# Patient Record
Sex: FEMALE | Race: WHITE | NOT HISPANIC OR LATINO | Employment: OTHER | URBAN - METROPOLITAN AREA
[De-identification: names, ages, dates, MRNs, and addresses within clinical notes are randomized per-mention and may not be internally consistent; named-entity substitution may affect disease eponyms.]

---

## 2023-06-01 DIAGNOSIS — K21.9 GASTROESOPHAGEAL REFLUX DISEASE WITHOUT ESOPHAGITIS: ICD-10-CM

## 2023-06-01 DIAGNOSIS — I10 HYPERTENSION, ESSENTIAL, BENIGN: ICD-10-CM

## 2023-06-01 DIAGNOSIS — Z86.73 HISTORY OF CVA (CEREBROVASCULAR ACCIDENT): Primary | ICD-10-CM

## 2023-06-01 NOTE — TELEPHONE ENCOUNTER
Type of form: Medical Certification of illness    Received via: fax    Received from: [employer]    When completed and signed: Fax # [phone]    Form placed: In your to do folder

## 2023-06-09 PROBLEM — F41.9 ANXIETY: Status: ACTIVE | Noted: 2023-06-09

## 2023-06-09 PROBLEM — R41.3 MEMORY DEFICIT: Status: ACTIVE | Noted: 2023-06-09

## 2023-06-09 PROBLEM — K21.9 GASTROESOPHAGEAL REFLUX DISEASE WITHOUT ESOPHAGITIS: Status: ACTIVE | Noted: 2023-06-09

## 2023-06-09 PROBLEM — F32.1 CURRENT MODERATE EPISODE OF MAJOR DEPRESSIVE DISORDER WITHOUT PRIOR EPISODE (MULTI): Status: ACTIVE | Noted: 2023-06-09

## 2023-06-09 PROBLEM — Z85.828 HISTORY OF BASAL CELL CANCER: Status: RESOLVED | Noted: 2023-06-09 | Resolved: 2023-06-09

## 2023-06-09 PROBLEM — R93.5 ABNORMAL COMPUTED TOMOGRAPHY OF PELVIS: Status: RESOLVED | Noted: 2023-06-09 | Resolved: 2023-06-09

## 2023-06-09 PROBLEM — Z86.73 HISTORY OF CVA (CEREBROVASCULAR ACCIDENT): Status: ACTIVE | Noted: 2023-06-09

## 2023-06-09 PROBLEM — R15.9 BOWEL INCONTINENCE: Status: RESOLVED | Noted: 2023-06-09 | Resolved: 2023-06-09

## 2023-06-09 PROBLEM — J45.20 MILD INTERMITTENT ASTHMA WITHOUT COMPLICATION (HHS-HCC): Status: ACTIVE | Noted: 2023-06-09

## 2023-06-09 PROBLEM — L57.0 ACTINIC KERATOSES: Status: RESOLVED | Noted: 2023-06-09 | Resolved: 2023-06-09

## 2023-06-09 PROBLEM — K59.09 CHRONIC CONSTIPATION: Status: RESOLVED | Noted: 2023-06-09 | Resolved: 2023-06-09

## 2023-06-09 PROBLEM — K64.9 BLEEDING HEMORRHOID: Status: RESOLVED | Noted: 2023-06-09 | Resolved: 2023-06-09

## 2023-06-09 PROBLEM — D21.9 FIBROIDS: Status: RESOLVED | Noted: 2023-06-09 | Resolved: 2023-06-09

## 2023-06-09 PROBLEM — I10 HYPERTENSION, ESSENTIAL, BENIGN: Status: ACTIVE | Noted: 2023-06-09

## 2023-06-09 PROBLEM — F33.1 MODERATE EPISODE OF RECURRENT MAJOR DEPRESSIVE DISORDER (MULTI): Status: ACTIVE | Noted: 2023-06-09

## 2023-06-09 RX ORDER — OMEPRAZOLE 20 MG/1
20 CAPSULE, DELAYED RELEASE ORAL DAILY
Qty: 90 CAPSULE | Refills: 0 | Status: SHIPPED | OUTPATIENT
Start: 2023-06-09 | End: 2023-07-20 | Stop reason: SDUPTHER

## 2023-06-09 RX ORDER — OMEPRAZOLE 20 MG/1
20 CAPSULE, DELAYED RELEASE ORAL DAILY
COMMUNITY
End: 2023-06-09 | Stop reason: SDUPTHER

## 2023-06-09 RX ORDER — ATORVASTATIN CALCIUM 40 MG/1
40 TABLET, FILM COATED ORAL DAILY
Qty: 30 TABLET | Refills: 0 | Status: SHIPPED | OUTPATIENT
Start: 2023-06-09 | End: 2023-07-20 | Stop reason: SDUPTHER

## 2023-06-09 RX ORDER — LISINOPRIL 10 MG/1
10 TABLET ORAL DAILY
COMMUNITY
End: 2023-06-09 | Stop reason: SDUPTHER

## 2023-06-09 RX ORDER — ATORVASTATIN CALCIUM 40 MG/1
40 TABLET, FILM COATED ORAL DAILY
COMMUNITY
End: 2023-06-09 | Stop reason: SDUPTHER

## 2023-06-09 RX ORDER — LISINOPRIL 10 MG/1
10 TABLET ORAL DAILY
Qty: 90 TABLET | Refills: 0 | Status: SHIPPED | OUTPATIENT
Start: 2023-06-09 | End: 2023-07-20 | Stop reason: SDUPTHER

## 2023-07-14 LAB
ALANINE AMINOTRANSFERASE (SGPT) (U/L) IN SER/PLAS: 25 U/L (ref 7–45)
ALBUMIN (G/DL) IN SER/PLAS: 4.3 G/DL (ref 3.4–5)
ALKALINE PHOSPHATASE (U/L) IN SER/PLAS: 78 U/L (ref 33–136)
ASPARTATE AMINOTRANSFERASE (SGOT) (U/L) IN SER/PLAS: 25 U/L (ref 9–39)
BILIRUBIN DIRECT (MG/DL) IN SER/PLAS: 0.1 MG/DL (ref 0–0.3)
BILIRUBIN TOTAL (MG/DL) IN SER/PLAS: 0.7 MG/DL (ref 0–1.2)
CHOLESTEROL (MG/DL) IN SER/PLAS: 175 MG/DL (ref 0–199)
CHOLESTEROL IN HDL (MG/DL) IN SER/PLAS: 43.6 MG/DL
CHOLESTEROL/HDL RATIO: 4
LDL: 94 MG/DL (ref 0–99)
PROTEIN TOTAL: 7.1 G/DL (ref 6.4–8.2)
TRIGLYCERIDE (MG/DL) IN SER/PLAS: 188 MG/DL (ref 0–149)
VLDL: 38 MG/DL (ref 0–40)

## 2023-07-20 ENCOUNTER — OFFICE VISIT (OUTPATIENT)
Dept: PRIMARY CARE | Facility: CLINIC | Age: 68
End: 2023-07-20
Payer: COMMERCIAL

## 2023-07-20 VITALS
HEART RATE: 99 BPM | DIASTOLIC BLOOD PRESSURE: 75 MMHG | SYSTOLIC BLOOD PRESSURE: 119 MMHG | WEIGHT: 211 LBS | OXYGEN SATURATION: 96 %

## 2023-07-20 DIAGNOSIS — F41.9 ANXIETY: ICD-10-CM

## 2023-07-20 DIAGNOSIS — Z86.73 HISTORY OF CVA (CEREBROVASCULAR ACCIDENT): ICD-10-CM

## 2023-07-20 DIAGNOSIS — I10 HYPERTENSION, ESSENTIAL, BENIGN: Primary | ICD-10-CM

## 2023-07-20 DIAGNOSIS — K21.9 GASTROESOPHAGEAL REFLUX DISEASE WITHOUT ESOPHAGITIS: ICD-10-CM

## 2023-07-20 PROCEDURE — 3078F DIAST BP <80 MM HG: CPT | Performed by: FAMILY MEDICINE

## 2023-07-20 PROCEDURE — 99214 OFFICE O/P EST MOD 30 MIN: CPT | Performed by: FAMILY MEDICINE

## 2023-07-20 PROCEDURE — 1160F RVW MEDS BY RX/DR IN RCRD: CPT | Performed by: FAMILY MEDICINE

## 2023-07-20 PROCEDURE — 1159F MED LIST DOCD IN RCRD: CPT | Performed by: FAMILY MEDICINE

## 2023-07-20 PROCEDURE — 3074F SYST BP LT 130 MM HG: CPT | Performed by: FAMILY MEDICINE

## 2023-07-20 PROCEDURE — 1036F TOBACCO NON-USER: CPT | Performed by: FAMILY MEDICINE

## 2023-07-20 RX ORDER — LANOLIN ALCOHOL/MO/W.PET/CERES
1 CREAM (GRAM) TOPICAL DAILY
COMMUNITY

## 2023-07-20 RX ORDER — PAROXETINE HYDROCHLORIDE 40 MG/1
40 TABLET, FILM COATED ORAL DAILY
COMMUNITY
End: 2023-07-20 | Stop reason: SDUPTHER

## 2023-07-20 RX ORDER — ASPIRIN 81 MG/1
1 TABLET ORAL DAILY
COMMUNITY

## 2023-07-20 RX ORDER — ATORVASTATIN CALCIUM 40 MG/1
40 TABLET, FILM COATED ORAL DAILY
Qty: 90 TABLET | Refills: 3 | Status: SHIPPED | OUTPATIENT
Start: 2023-07-20 | End: 2024-04-11 | Stop reason: WASHOUT

## 2023-07-20 RX ORDER — CLONAZEPAM 0.5 MG/1
TABLET ORAL EVERY 12 HOURS PRN
COMMUNITY
Start: 2019-10-30 | End: 2024-04-11 | Stop reason: WASHOUT

## 2023-07-20 RX ORDER — LISINOPRIL 10 MG/1
10 TABLET ORAL DAILY
Qty: 90 TABLET | Refills: 1 | Status: SHIPPED | OUTPATIENT
Start: 2023-07-20 | End: 2023-11-14 | Stop reason: SDUPTHER

## 2023-07-20 RX ORDER — OMEPRAZOLE 20 MG/1
20 CAPSULE, DELAYED RELEASE ORAL DAILY
Qty: 90 CAPSULE | Refills: 3 | Status: SHIPPED | OUTPATIENT
Start: 2023-07-20

## 2023-07-20 RX ORDER — ALBUTEROL SULFATE 90 UG/1
AEROSOL, METERED RESPIRATORY (INHALATION)
COMMUNITY

## 2023-07-20 RX ORDER — PAROXETINE HYDROCHLORIDE 40 MG/1
40 TABLET, FILM COATED ORAL DAILY
Qty: 90 TABLET | Refills: 1 | Status: SHIPPED | OUTPATIENT
Start: 2023-07-20 | End: 2024-04-05 | Stop reason: SDUPTHER

## 2023-07-20 ASSESSMENT — PATIENT HEALTH QUESTIONNAIRE - PHQ9
SUM OF ALL RESPONSES TO PHQ QUESTIONS 1-9: 13
6. FEELING BAD ABOUT YOURSELF - OR THAT YOU ARE A FAILURE OR HAVE LET YOURSELF OR YOUR FAMILY DOWN: NEARLY EVERY DAY
7. TROUBLE CONCENTRATING ON THINGS, SUCH AS READING THE NEWSPAPER OR WATCHING TELEVISION: NOT AT ALL
4. FEELING TIRED OR HAVING LITTLE ENERGY: SEVERAL DAYS
8. MOVING OR SPEAKING SO SLOWLY THAT OTHER PEOPLE COULD HAVE NOTICED. OR THE OPPOSITE, BEING SO FIGETY OR RESTLESS THAT YOU HAVE BEEN MOVING AROUND A LOT MORE THAN USUAL: NOT AT ALL
10. IF YOU CHECKED OFF ANY PROBLEMS, HOW DIFFICULT HAVE THESE PROBLEMS MADE IT FOR YOU TO DO YOUR WORK, TAKE CARE OF THINGS AT HOME, OR GET ALONG WITH OTHER PEOPLE: VERY DIFFICULT
3. TROUBLE FALLING OR STAYING ASLEEP OR SLEEPING TOO MUCH: NEARLY EVERY DAY
9. THOUGHTS THAT YOU WOULD BE BETTER OFF DEAD, OR OF HURTING YOURSELF: NOT AT ALL
1. LITTLE INTEREST OR PLEASURE IN DOING THINGS: MORE THAN HALF THE DAYS
SUM OF ALL RESPONSES TO PHQ9 QUESTIONS 1 AND 2: 5
2. FEELING DOWN, DEPRESSED OR HOPELESS: NEARLY EVERY DAY
5. POOR APPETITE OR OVEREATING: SEVERAL DAYS

## 2023-07-20 NOTE — PROGRESS NOTES
Subjective   Patient ID: Rosie Huynh is a 67 y.o. female who presents for Follow-up (Review labs and follow up blood pressure).  She tries to take her meds daily as directed.  She rarely needs her klonopin now and says she has plenty left.  No new complaints.  She has increased stress lately, daughter is angry with her and they are not talking.  This makes her sad, and she hopes it will be resolved soon.  She is active with her dog, working, volunteering, socializing with friends and sister.      Histories reviewed      Objective   /75   Pulse 99   Wt 95.7 kg (211 lb)   SpO2 96%    Physical Exam  Alert adult woman, NAD  Affect pleasant  Heart RRR no murmur  Lungs CTA bilat  No pedal edema    Recent lipid panel and LFTs WNL      Problem List Items Addressed This Visit       Anxiety    Relevant Medications    PARoxetine (Paxil) 40 mg tablet    Gastroesophageal reflux disease without esophagitis    Relevant Medications    omeprazole (PriLOSEC) 20 mg DR capsule    History of CVA (cerebrovascular accident)    Relevant Medications    atorvastatin (Lipitor) 40 mg tablet    Hypertension, essential, benign - Primary    Relevant Medications    lisinopril 10 mg tablet     Follow up 6 months

## 2023-07-30 PROBLEM — S82.024A CLOSED NONDISPLACED LONGITUDINAL FRACTURE OF RIGHT PATELLA: Status: RESOLVED | Noted: 2023-07-30 | Resolved: 2023-07-30

## 2023-07-30 PROBLEM — S82.832A CLOSED FRACTURE OF LEFT DISTAL FIBULA: Status: RESOLVED | Noted: 2018-07-31 | Resolved: 2023-07-30

## 2023-08-25 ENCOUNTER — PATIENT OUTREACH (OUTPATIENT)
Dept: CARE COORDINATION | Facility: CLINIC | Age: 68
End: 2023-08-25
Payer: COMMERCIAL

## 2023-08-25 ENCOUNTER — DOCUMENTATION (OUTPATIENT)
Dept: PRIMARY CARE | Facility: CLINIC | Age: 68
End: 2023-08-25
Payer: COMMERCIAL

## 2023-08-25 DIAGNOSIS — R07.9 CHEST PAIN, UNSPECIFIED TYPE: ICD-10-CM

## 2023-08-25 NOTE — PROGRESS NOTES
Discharge Facility:[facility]  Discharge Diagnosis:R07.9 Chest pain  Admission Date:8/23/23  Discharge Date: 8/24/23    PCP Appointment Date:No contact task sent to office  Specialist Appointment Date:   Hospital Encounter and Summary: not available at this time

## 2023-09-08 ENCOUNTER — PATIENT OUTREACH (OUTPATIENT)
Dept: CARE COORDINATION | Facility: CLINIC | Age: 68
End: 2023-09-08
Payer: COMMERCIAL

## 2023-09-08 NOTE — PROGRESS NOTES
Unable to reach patient for call back after patient's hospital discharge.   LVM with call back number for patient to call if needed   If no voicemail available call attempts x 2 were made to contact the patient to assist with any questions or concerns patient may have.

## 2023-10-18 ENCOUNTER — PATIENT OUTREACH (OUTPATIENT)
Dept: CARE COORDINATION | Facility: CLINIC | Age: 68
End: 2023-10-18
Payer: COMMERCIAL

## 2023-10-23 ENCOUNTER — TELEMEDICINE (OUTPATIENT)
Dept: PRIMARY CARE | Facility: CLINIC | Age: 68
End: 2023-10-23
Payer: COMMERCIAL

## 2023-10-23 DIAGNOSIS — L98.9 SKIN LESION OF FACE: ICD-10-CM

## 2023-10-23 DIAGNOSIS — J01.10 ACUTE NON-RECURRENT FRONTAL SINUSITIS: Primary | ICD-10-CM

## 2023-10-23 PROCEDURE — 99213 OFFICE O/P EST LOW 20 MIN: CPT | Performed by: FAMILY MEDICINE

## 2023-10-23 RX ORDER — AMOXICILLIN AND CLAVULANATE POTASSIUM 875; 125 MG/1; MG/1
875 TABLET, FILM COATED ORAL 2 TIMES DAILY
Qty: 14 TABLET | Refills: 0 | Status: SHIPPED | OUTPATIENT
Start: 2023-10-23 | End: 2023-10-30

## 2023-10-23 ASSESSMENT — ENCOUNTER SYMPTOMS
CHILLS: 1
RHINORRHEA: 1
HEADACHES: 1
SWEATS: 1
COUGH: 1
MYALGIAS: 1
FEVER: 1

## 2023-10-23 NOTE — PROGRESS NOTES
This is a VIRTUAL/TELEPHONE visit in place of a scheduled office visit due to current Covid-19 situation.  Pt is informed at the beginning of the call that he/she may be billed for this virtual/telephone appointment and if the insurance company does not cover this service, the pt may be billed by .    Total phone visit time: 15 min    Subjective   Patient ID: Rosie Huynh is a 68 y.o. female who presents for Sinus Problem (Patient has been having sinus issues for the past 3 weeks. ).  She feels like her sxs have not gotten any better in 2 weeks now.  She has green mucous, sometimes bloody.  She has slight cough.  She has a lot of headache.   She does not have a thermometer, but feels very hot at times.  Covid test negative x 4.   She tried mucinex and aspirin.      Also she has a red spot near her left eyebrow, has been there for 12 years, but it comes and goes.  She says this time it will not go away.  Difficult to see on Video      Histories reviewed      Objective   There were no vitals taken for this visit.   Physical Exam  Alert adult woman, tired appearing  No resp distress  Normal speech    Problem List Items Addressed This Visit    None  Visit Diagnoses         Codes    Acute non-recurrent frontal sinusitis    -  Primary J01.10    Skin lesion of face     L98.9    Relevant Orders    Referral to Dermatology          Augmentin Rx  Reviewed sxs tx for sinusitis

## 2023-11-07 PROBLEM — M25.461 EFFUSION OF RIGHT KNEE: Status: ACTIVE | Noted: 2023-11-07

## 2023-11-07 PROBLEM — R19.8 CHANGE IN BOWEL MOVEMENT: Status: ACTIVE | Noted: 2023-11-07

## 2023-11-07 PROBLEM — R47.89 WORD FINDING DIFFICULTY: Status: ACTIVE | Noted: 2023-11-07

## 2023-11-07 PROBLEM — E03.9 HYPOTHYROIDISM: Status: ACTIVE | Noted: 2023-11-07

## 2023-11-07 PROBLEM — N83.8 COMPLEX CYST OF UTERINE ADNEXA: Status: ACTIVE | Noted: 2023-11-07

## 2023-11-07 PROBLEM — D22.70 MELANOCYTIC NEVI OF UNSPECIFIED LOWER LIMB, INCLUDING HIP: Status: ACTIVE | Noted: 2022-03-14

## 2023-11-07 PROBLEM — D22.5 MELANOCYTIC NEVI OF TRUNK: Status: ACTIVE | Noted: 2022-03-14

## 2023-11-07 PROBLEM — D48.5 NEOPLASM OF UNCERTAIN BEHAVIOR OF SKIN: Status: ACTIVE | Noted: 2022-03-14

## 2023-11-07 PROBLEM — L90.5 SCAR CONDITION AND FIBROSIS OF SKIN: Status: ACTIVE | Noted: 2022-03-14

## 2023-11-07 PROBLEM — R19.00 PELVIC MASS: Status: ACTIVE | Noted: 2023-11-07

## 2023-11-07 PROBLEM — F32.A DEPRESSIVE DISORDER: Status: ACTIVE | Noted: 2023-11-07

## 2023-11-07 PROBLEM — I63.9 CEREBELLAR INFARCTION (MULTI): Status: ACTIVE | Noted: 2019-01-15

## 2023-11-07 PROBLEM — S82.009A PATELLA FRACTURE: Status: ACTIVE | Noted: 2023-11-07

## 2023-11-07 PROBLEM — D18.01 HEMANGIOMA OF SKIN AND SUBCUTANEOUS TISSUE: Status: ACTIVE | Noted: 2022-03-14

## 2023-11-07 PROBLEM — M54.50 LOW BACK PAIN: Status: ACTIVE | Noted: 2023-11-07

## 2023-11-07 PROBLEM — L57.9 SKIN CHANGES DUE TO CHRONIC EXPOSURE TO NONIONIZING RADIATION, UNSPECIFIED: Status: ACTIVE | Noted: 2022-03-14

## 2023-11-07 PROBLEM — C44.319 BASAL CELL CARCINOMA OF SKIN OF OTHER PARTS OF FACE: Status: ACTIVE | Noted: 2022-03-14

## 2023-11-07 PROBLEM — M25.561 ACUTE PAIN OF RIGHT KNEE: Status: ACTIVE | Noted: 2023-11-07

## 2023-11-07 PROBLEM — D22.60 MELANOCYTIC NEVI OF UNSPECIFIED UPPER LIMB, INCLUDING SHOULDER: Status: ACTIVE | Noted: 2022-03-14

## 2023-11-07 PROBLEM — J45.909 ASTHMA (HHS-HCC): Status: ACTIVE | Noted: 2023-11-07

## 2023-11-07 PROBLEM — L28.1 PRURIGO NODULARIS: Status: ACTIVE | Noted: 2022-03-14

## 2023-11-07 PROBLEM — R23.8 PAPULE OF SKIN: Status: ACTIVE | Noted: 2023-11-07

## 2023-11-07 PROBLEM — L82.1 OTHER SEBORRHEIC KERATOSIS: Status: ACTIVE | Noted: 2022-03-14

## 2023-11-07 PROBLEM — L81.4 OTHER MELANIN HYPERPIGMENTATION: Status: ACTIVE | Noted: 2022-03-14

## 2023-11-07 RX ORDER — TRIAMCINOLONE ACETONIDE 1 MG/G
CREAM TOPICAL
COMMUNITY
Start: 2022-03-14 | End: 2024-02-29 | Stop reason: ALTCHOICE

## 2023-11-07 RX ORDER — DICLOFENAC SODIUM 75 MG/1
75 TABLET, DELAYED RELEASE ORAL 2 TIMES DAILY
COMMUNITY
Start: 2019-09-20 | End: 2024-02-29 | Stop reason: ALTCHOICE

## 2023-11-07 RX ORDER — CHLORHEXIDINE GLUCONATE ORAL RINSE 1.2 MG/ML
15 SOLUTION DENTAL 2 TIMES DAILY
COMMUNITY
Start: 2019-09-20 | End: 2024-02-29 | Stop reason: ALTCHOICE

## 2023-11-07 RX ORDER — LIDOCAINE HYDROCHLORIDE 20 MG/ML
2.5 SOLUTION OROPHARYNGEAL AS NEEDED
COMMUNITY
Start: 2019-09-20 | End: 2023-11-14 | Stop reason: ALTCHOICE

## 2023-11-07 RX ORDER — IBUPROFEN 600 MG/1
600 TABLET ORAL EVERY 6 HOURS PRN
COMMUNITY
Start: 2020-12-03 | End: 2024-04-11 | Stop reason: WASHOUT

## 2023-11-07 RX ORDER — BUDESONIDE AND FORMOTEROL FUMARATE DIHYDRATE 160; 4.5 UG/1; UG/1
2 AEROSOL RESPIRATORY (INHALATION) 2 TIMES DAILY
COMMUNITY
Start: 2018-04-11 | End: 2023-11-14 | Stop reason: ALTCHOICE

## 2023-11-14 ENCOUNTER — OFFICE VISIT (OUTPATIENT)
Dept: PRIMARY CARE | Facility: CLINIC | Age: 68
End: 2023-11-14
Payer: COMMERCIAL

## 2023-11-14 VITALS
BODY MASS INDEX: 31.61 KG/M2 | HEIGHT: 69 IN | SYSTOLIC BLOOD PRESSURE: 123 MMHG | WEIGHT: 213.4 LBS | OXYGEN SATURATION: 97 % | DIASTOLIC BLOOD PRESSURE: 84 MMHG | HEART RATE: 88 BPM

## 2023-11-14 DIAGNOSIS — J45.20 MILD INTERMITTENT ASTHMA WITHOUT COMPLICATION (HHS-HCC): ICD-10-CM

## 2023-11-14 DIAGNOSIS — Z00.00 MEDICARE ANNUAL WELLNESS VISIT, SUBSEQUENT: Primary | ICD-10-CM

## 2023-11-14 DIAGNOSIS — I10 HYPERTENSION, ESSENTIAL, BENIGN: ICD-10-CM

## 2023-11-14 DIAGNOSIS — F41.9 ANXIETY: ICD-10-CM

## 2023-11-14 DIAGNOSIS — R41.840 POOR CONCENTRATION: ICD-10-CM

## 2023-11-14 DIAGNOSIS — F33.1 MODERATE EPISODE OF RECURRENT MAJOR DEPRESSIVE DISORDER (MULTI): ICD-10-CM

## 2023-11-14 PROCEDURE — 99213 OFFICE O/P EST LOW 20 MIN: CPT | Performed by: FAMILY MEDICINE

## 2023-11-14 PROCEDURE — 3008F BODY MASS INDEX DOCD: CPT | Performed by: FAMILY MEDICINE

## 2023-11-14 PROCEDURE — 3079F DIAST BP 80-89 MM HG: CPT | Performed by: FAMILY MEDICINE

## 2023-11-14 PROCEDURE — 1036F TOBACCO NON-USER: CPT | Performed by: FAMILY MEDICINE

## 2023-11-14 PROCEDURE — 1170F FXNL STATUS ASSESSED: CPT | Performed by: FAMILY MEDICINE

## 2023-11-14 PROCEDURE — 90662 IIV NO PRSV INCREASED AG IM: CPT | Performed by: FAMILY MEDICINE

## 2023-11-14 PROCEDURE — G0439 PPPS, SUBSEQ VISIT: HCPCS | Performed by: FAMILY MEDICINE

## 2023-11-14 PROCEDURE — G0008 ADMIN INFLUENZA VIRUS VAC: HCPCS | Performed by: FAMILY MEDICINE

## 2023-11-14 PROCEDURE — 1159F MED LIST DOCD IN RCRD: CPT | Performed by: FAMILY MEDICINE

## 2023-11-14 PROCEDURE — 1160F RVW MEDS BY RX/DR IN RCRD: CPT | Performed by: FAMILY MEDICINE

## 2023-11-14 PROCEDURE — 3074F SYST BP LT 130 MM HG: CPT | Performed by: FAMILY MEDICINE

## 2023-11-14 RX ORDER — BUPROPION HYDROCHLORIDE 150 MG/1
150 TABLET ORAL EVERY MORNING
Qty: 90 TABLET | Refills: 0 | Status: SHIPPED | OUTPATIENT
Start: 2023-11-14 | End: 2024-03-03 | Stop reason: ALTCHOICE

## 2023-11-14 RX ORDER — LISINOPRIL 10 MG/1
10 TABLET ORAL DAILY
Qty: 90 TABLET | Refills: 1 | Status: SHIPPED | OUTPATIENT
Start: 2023-11-14

## 2023-11-14 ASSESSMENT — ACTIVITIES OF DAILY LIVING (ADL)
BATHING: INDEPENDENT
DRESSING: INDEPENDENT
TAKING_MEDICATION: INDEPENDENT
GROCERY_SHOPPING: INDEPENDENT
DOING_HOUSEWORK: INDEPENDENT
MANAGING_FINANCES: INDEPENDENT

## 2023-11-14 ASSESSMENT — PATIENT HEALTH QUESTIONNAIRE - PHQ9
8. MOVING OR SPEAKING SO SLOWLY THAT OTHER PEOPLE COULD HAVE NOTICED. OR THE OPPOSITE, BEING SO FIGETY OR RESTLESS THAT YOU HAVE BEEN MOVING AROUND A LOT MORE THAN USUAL: MORE THAN HALF THE DAYS
1. LITTLE INTEREST OR PLEASURE IN DOING THINGS: SEVERAL DAYS
SUM OF ALL RESPONSES TO PHQ QUESTIONS 1-9: 24
9. THOUGHTS THAT YOU WOULD BE BETTER OFF DEAD, OR OF HURTING YOURSELF: MORE THAN HALF THE DAYS
10. IF YOU CHECKED OFF ANY PROBLEMS, HOW DIFFICULT HAVE THESE PROBLEMS MADE IT FOR YOU TO DO YOUR WORK, TAKE CARE OF THINGS AT HOME, OR GET ALONG WITH OTHER PEOPLE: VERY DIFFICULT
SUM OF ALL RESPONSES TO PHQ9 QUESTIONS 1 AND 2: 6
3. TROUBLE FALLING OR STAYING ASLEEP OR SLEEPING TOO MUCH: NEARLY EVERY DAY
2. FEELING DOWN, DEPRESSED OR HOPELESS: NEARLY EVERY DAY
6. FEELING BAD ABOUT YOURSELF - OR THAT YOU ARE A FAILURE OR HAVE LET YOURSELF OR YOUR FAMILY DOWN: NEARLY EVERY DAY
SUM OF ALL RESPONSES TO PHQ9 QUESTIONS 1 AND 2: 2
4. FEELING TIRED OR HAVING LITTLE ENERGY: NEARLY EVERY DAY
1. LITTLE INTEREST OR PLEASURE IN DOING THINGS: NEARLY EVERY DAY
10. IF YOU CHECKED OFF ANY PROBLEMS, HOW DIFFICULT HAVE THESE PROBLEMS MADE IT FOR YOU TO DO YOUR WORK, TAKE CARE OF THINGS AT HOME, OR GET ALONG WITH OTHER PEOPLE: SOMEWHAT DIFFICULT
7. TROUBLE CONCENTRATING ON THINGS, SUCH AS READING THE NEWSPAPER OR WATCHING TELEVISION: NEARLY EVERY DAY
5. POOR APPETITE OR OVEREATING: MORE THAN HALF THE DAYS
2. FEELING DOWN, DEPRESSED OR HOPELESS: SEVERAL DAYS

## 2023-11-14 NOTE — PROGRESS NOTES
"Subjective   Reason for Visit: Rosie Huynh is an 68 y.o. female here for a Medicare Wellness visit.     She is frustrated by her weight.  She gets plenty of steps, but her diet could be a lot better.              HPI    Patient Care Team:  Katherine Olmos MD as PCP - General (Family Medicine)     Review of Systems    Objective   Vitals:  /84   Pulse 88   Ht 1.753 m (5' 9\")   Wt 96.8 kg (213 lb 6.4 oz)   LMP  (LMP Unknown)   SpO2 97%   BMI 31.51 kg/m²       Physical Exam    Assessment/Plan   Problem List Items Addressed This Visit       Anxiety    Relevant Medications    buPROPion XL (Wellbutrin XL) 150 mg 24 hr tablet    Hypertension, essential, benign    Relevant Medications    lisinopril 10 mg tablet    Mild intermittent asthma without complication    Moderate episode of recurrent major depressive disorder (CMS/HCC)    Relevant Medications    buPROPion XL (Wellbutrin XL) 150 mg 24 hr tablet     Other Visit Diagnoses       Medicare annual wellness visit, subsequent    -  Primary    Poor concentration              Reviewed ways to improve concentration and memory  Discussed decreasing screen time     "

## 2023-11-15 ENCOUNTER — APPOINTMENT (OUTPATIENT)
Dept: PRIMARY CARE | Facility: CLINIC | Age: 68
End: 2023-11-15
Payer: COMMERCIAL

## 2024-01-12 ENCOUNTER — TELEPHONE (OUTPATIENT)
Dept: PRIMARY CARE | Facility: CLINIC | Age: 69
End: 2024-01-12
Payer: COMMERCIAL

## 2024-01-12 DIAGNOSIS — Z12.11 SCREENING FOR COLON CANCER: ICD-10-CM

## 2024-01-12 DIAGNOSIS — R41.3 MEMORY DEFICIT: ICD-10-CM

## 2024-01-12 DIAGNOSIS — Z86.73 HISTORY OF CVA (CEREBROVASCULAR ACCIDENT): ICD-10-CM

## 2024-01-12 DIAGNOSIS — I63.9 CEREBELLAR INFARCTION (MULTI): Primary | ICD-10-CM

## 2024-01-12 DIAGNOSIS — Q21.12 PFO (PATENT FORAMEN OVALE) (HHS-HCC): ICD-10-CM

## 2024-01-12 DIAGNOSIS — I67.89 ACUTE, BUT ILL-DEFINED, CEREBROVASCULAR DISEASE: ICD-10-CM

## 2024-01-12 NOTE — TELEPHONE ENCOUNTER
Pt said since her insurance no longer is accepted at [facility], she's asking for a referral for:  [facility] neurologist and a cardiologist.    Pt added she'd like to do the colonoscopy you discusses at her last appt if you can place an order for that as well.

## 2024-02-26 DIAGNOSIS — Z12.11 COLON CANCER SCREENING: ICD-10-CM

## 2024-02-29 ENCOUNTER — OFFICE VISIT (OUTPATIENT)
Dept: CARDIOLOGY | Facility: CLINIC | Age: 69
End: 2024-02-29
Payer: COMMERCIAL

## 2024-02-29 VITALS
WEIGHT: 213.4 LBS | BODY MASS INDEX: 31.61 KG/M2 | OXYGEN SATURATION: 96 % | DIASTOLIC BLOOD PRESSURE: 84 MMHG | HEART RATE: 102 BPM | SYSTOLIC BLOOD PRESSURE: 126 MMHG | HEIGHT: 69 IN

## 2024-02-29 DIAGNOSIS — R06.09 DOE (DYSPNEA ON EXERTION): ICD-10-CM

## 2024-02-29 DIAGNOSIS — M54.50 CHRONIC LOW BACK PAIN, UNSPECIFIED BACK PAIN LATERALITY, UNSPECIFIED WHETHER SCIATICA PRESENT: ICD-10-CM

## 2024-02-29 DIAGNOSIS — I67.89 ACUTE, BUT ILL-DEFINED, CEREBROVASCULAR DISEASE: ICD-10-CM

## 2024-02-29 DIAGNOSIS — J45.909 MILD ASTHMA, UNSPECIFIED WHETHER COMPLICATED, UNSPECIFIED WHETHER PERSISTENT (HHS-HCC): ICD-10-CM

## 2024-02-29 DIAGNOSIS — I10 HYPERTENSION, ESSENTIAL, BENIGN: ICD-10-CM

## 2024-02-29 DIAGNOSIS — Q21.12 PFO (PATENT FORAMEN OVALE) (HHS-HCC): Primary | ICD-10-CM

## 2024-02-29 DIAGNOSIS — R09.89 BILATERAL CAROTID BRUITS: ICD-10-CM

## 2024-02-29 DIAGNOSIS — G89.29 CHRONIC LOW BACK PAIN, UNSPECIFIED BACK PAIN LATERALITY, UNSPECIFIED WHETHER SCIATICA PRESENT: ICD-10-CM

## 2024-02-29 DIAGNOSIS — K21.9 GASTROESOPHAGEAL REFLUX DISEASE WITHOUT ESOPHAGITIS: ICD-10-CM

## 2024-02-29 DIAGNOSIS — Z86.73 HISTORY OF CVA (CEREBROVASCULAR ACCIDENT): ICD-10-CM

## 2024-02-29 DIAGNOSIS — G43.909 MIGRAINE WITHOUT STATUS MIGRAINOSUS, NOT INTRACTABLE, UNSPECIFIED MIGRAINE TYPE: ICD-10-CM

## 2024-02-29 DIAGNOSIS — R61 DIAPHORESIS: ICD-10-CM

## 2024-02-29 DIAGNOSIS — M54.2 NECK PAIN: ICD-10-CM

## 2024-02-29 DIAGNOSIS — R00.2 PALPITATIONS: ICD-10-CM

## 2024-02-29 DIAGNOSIS — E03.9 ACQUIRED HYPOTHYROIDISM: ICD-10-CM

## 2024-02-29 DIAGNOSIS — R41.3 MEMORY DEFICIT: ICD-10-CM

## 2024-02-29 DIAGNOSIS — R42 LIGHT HEADEDNESS: ICD-10-CM

## 2024-02-29 DIAGNOSIS — R00.0 SINUS TACHYCARDIA: ICD-10-CM

## 2024-02-29 DIAGNOSIS — F41.9 ANXIETY: ICD-10-CM

## 2024-02-29 PROCEDURE — 93000 ELECTROCARDIOGRAM COMPLETE: CPT | Performed by: INTERNAL MEDICINE

## 2024-02-29 PROCEDURE — 1159F MED LIST DOCD IN RCRD: CPT | Performed by: INTERNAL MEDICINE

## 2024-02-29 PROCEDURE — 3074F SYST BP LT 130 MM HG: CPT | Performed by: INTERNAL MEDICINE

## 2024-02-29 PROCEDURE — 3079F DIAST BP 80-89 MM HG: CPT | Performed by: INTERNAL MEDICINE

## 2024-02-29 PROCEDURE — 1125F AMNT PAIN NOTED PAIN PRSNT: CPT | Performed by: INTERNAL MEDICINE

## 2024-02-29 PROCEDURE — 99205 OFFICE O/P NEW HI 60 MIN: CPT | Performed by: INTERNAL MEDICINE

## 2024-02-29 PROCEDURE — 1036F TOBACCO NON-USER: CPT | Performed by: INTERNAL MEDICINE

## 2024-02-29 RX ORDER — METOPROLOL SUCCINATE 25 MG/1
25 TABLET, EXTENDED RELEASE ORAL DAILY
Qty: 30 TABLET | Refills: 11 | Status: SHIPPED | OUTPATIENT
Start: 2024-02-29 | End: 2024-04-11 | Stop reason: WASHOUT

## 2024-02-29 ASSESSMENT — PAIN SCALES - GENERAL: PAINLEVEL: 6

## 2024-02-29 NOTE — PROGRESS NOTES
CARDIOLOGY CONSULTATION NOTE       Patient:    Rosie Huynh    YOB: 1955  MRN:    [id number]    Date:   2/29/2024     Reason for Visit: New patient visit to establish ongoing cardiovascular care.     IMPRESSION:      Chest pain  Dyspnea on exertion  Neck discomfort  Back discomfort  Palpitations  Lightheadedness, dizziness  Diaphoresis episodes  Sinus tachycardia  Normal LV systolic function, LVEF 64% 9/2023  Negative nuclear stress test, 9/2023  Patent foramen ovale, post repair with helix, 2010  History of prior stroke, 2010  Migraine headache history  Hypertension  Hyperlipidemia  Asthma  GERD  Hypothyroidism  Degenerative joint disease  Chronic lower back pain  Anxiety  Prior hysterectomy  Family history of coronary artery disease  Otherwise as per assessment below.    RECOMMENDATIONS:      The patient has above-noted symptoms which are concerning for progressive cardiac disease including possible residual patent foramen ovale post prior repair.  Would suggest echocardiogram with bubble study to rule out patent rico ovale and significant shunting.  Would also suggest a carotid ultrasound and 48-hour Holter monitor given her symptomatic cardiology.  Further testing including possible invasive testing i.e. cardiac catheterization is warranted.    Would suggest trial of metoprolol succinate 25 mg daily as tolerated.  She will continue her other medications.  Refills were provided.    [employer] portal use was encouraged.    We will plan to see back following the above testing with Laboratory Studies and ECG as noted.     Patient will follow up with their primary physician for general care.    The patient knows to contact medical care earlier if need be.      HPI:     Rosie Huynh was seen in cardiac evaluation at the  Cardiology office February 29, 2024.      The patients problems are listed as in the impression above.    Electronic medical records reviewed.    Patient is a pleasant  68-year-old hypertensive, hyperlipidemic woman with the above past history including prior PFO closure 2010 post stroke.  She presents now with recurrent symptoms concerning for progressive cardiovascular disease.  We are asked to see her in consultation and to establish ongoing cardiovascular care.    She has been seen by Dr. Tony [facility] cardiology but  is not able to see him due to insurance reasons.  His notes have been reviewed.  He obtained a nuclear stress test of 9/2023 which was negative with ejection fraction of 64%.    She states that of recent she has been having worsening symptoms with palpitations, lightheadedness, dizziness and recurrent exertional chest pain and dyspnea on exertion.  She states that she has neck discomfort without radiation.  She has had some back discomfort as well.  She has history of migraine headaches.  She has had no recent visual changes.  She has had some episodic diaphoresis.  She has no other cardiovascular complaints.  She feels well currently.    Patient denies TIA or CVA symptoms.  No CHF or Edema.  No GI,  or Bleeding Issues. No Recent Fever or Chills.     Cardiovascular and general review of systems is otherwise negative.    A 14-system review is otherwise negative, other than noted.    ALLERGIES:     Allergies   Allergen Reactions    Cat Hair Standardized Allergenic Extract Itching    Mold Itching    Latex Unknown    Other Other        MEDICATIONS:     Current Outpatient Medications   Medication Instructions    albuterol 90 mcg/actuation inhaler inhalation    aspirin 81 mg EC tablet 1 tablet, oral, Daily    atorvastatin (LIPITOR) 40 mg, oral, Daily    buPROPion XL (WELLBUTRIN XL) 150 mg, oral, Every morning, Do not crush, chew, or split.    clonazePAM (KlonoPIN) 0.5 mg tablet oral, Every 12 hours PRN    cyanocobalamin (Vitamin B-12) 50 mcg tablet 1 tablet, oral, Daily    ibuprofen 600 mg, oral, Every 6 hours PRN    lisinopril 10 mg, oral, Daily    omeprazole  (PRILOSEC) 20 mg, oral, Daily    PARoxetine (PAXIL) 40 mg, oral, Daily    pyridoxine (Vitamin B-6) 50 mg tablet 1 tablet, oral, Daily       PAST MEDICAL HISTORY:   As per impression above.  No other significant past medical or surgical history appreciated.    SOCIAL HISTORY:   Single.  1 child.  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  Denies tobacco or illicit drug use.  Rare alcohol socially only.    FAMILY HISTORY:   Positive family history of CAD,   Mother with breast cancer, coronary artery disease, atrial fibrillation.    VITALS:     Vitals:    02/29/24 1428   BP: 126/84   Pulse: 102   SpO2: 96%       Wt Readings from Last 4 Encounters:   02/29/24 96.8 kg (213 lb 6.4 oz)   11/14/23 96.8 kg (213 lb 6.4 oz)   07/20/23 95.7 kg (211 lb)   01/16/23 94.3 kg (208 lb)       PHYSICAL EXAMINATION:      General: No acute distress. Vital signs as noted. Alert and oriented.  Head And Neck Examination: No jugular venous distention, no carotid bruits, no mass. Carotid upstrokes preserved. Oral mucosa moist.  No xanthelasma. Head and neck examination otherwise unremarkable.  Lungs: Clear to auscultation and percussion. No wheezes, no rales,  and no rhonchi.  Chest: Excursion appeared to be normal. No chest wall tenderness on palpation.  Heart: Normal S1 and S2. No S3. No S4. No rub. Grade 1/6 systolic murmur, best heard at the left sternal border. Point of maximal impulse was within normal limits.  Abdomen: Soft. Nontender. No organomegaly. No bruits. No masses. Obese.  Extremities: No bipedal edema. No clubbing. No cyanosis.  Pulses are strong throughout. No bruits.  Musculoskeletal Exam: No ulcers, otherwise unremarkable.  Neuro: Neurologically appeared grossly intact.    ELECTROCARDIOGRAM:      Sinus rhythm, nonspecific ST wave changes.  Rate 97.    CARDIAC TESTING:      None recent    LABORATORY DATA:      CBC:   Lab Results   Component Value Date    WBC 7.0 08/24/2023    RBC 3.92 (L) 08/24/2023    HGB 11.6 (L) 08/24/2023    HCT  35.3 (L) 08/24/2023     08/24/2023        CMP:    Lab Results   Component Value Date     08/24/2023    K 4.3 08/24/2023     08/24/2023    CO2 26 08/24/2023    BUN 19 08/24/2023    CREATININE 0.71 08/24/2023    GLUCOSE 94 08/24/2023    CALCIUM 8.6 08/24/2023       Magnesium:    Lab Results   Component Value Date    MG 2.21 08/24/2023       Lipid Profile:    Lab Results   Component Value Date    CHOL 159 08/24/2023    TRIG 158 (H) 08/24/2023    HDL 37.1 (A) 08/24/2023    LDLF 90 08/24/2023       Hepatic Function Panel:    Lab Results   Component Value Date    ALKPHOS 65 08/24/2023    ALT 19 08/24/2023    AST 20 08/24/2023    PROT 6.2 (L) 08/24/2023    BILITOT 0.5 08/24/2023    BILIDIR 0.1 07/14/2023       TSH:    Lab Results   Component Value Date    TSH 2.07 10/11/2022       HgBA1c:    Lab Results   Component Value Date    HGBA1C 5.2 08/24/2023       BNP:   Lab Results   Component Value Date    BNP 13 08/23/2023        PT/INR:    Lab Results   Component Value Date    PROTIME 12.7 08/24/2023    INR 1.1 08/24/2023       Cardiac Enzymes:    Lab Results   Component Value Date    TROPHS <3 08/24/2023    TROPHS <3 08/23/2023    TROPHS <3 08/23/2023                 PROBLEM LIST:     Patient Active Problem List   Diagnosis    Anxiety    Current moderate episode of major depressive disorder without prior episode (CMS/HCC)    Gastroesophageal reflux disease without esophagitis    History of CVA (cerebrovascular accident)    Hypertension, essential, benign    Memory deficit    Mild intermittent asthma without complication    Moderate episode of recurrent major depressive disorder (CMS/HCC)    Depressive disorder    Acute pain of right knee    Acute, but ill-defined, cerebrovascular disease    Asthma    Basal cell carcinoma of skin of other parts of face    Cerebellar infarction (CMS/HCC)    Change in bowel movement    Closed fracture of distal end of radius    Complex cyst of uterine adnexa    Effusion of  right knee    Hemangioma of skin and subcutaneous tissue    Hypothyroidism    Lack of coordination    Low back pain    Melanocytic nevi of trunk    Melanocytic nevi of unspecified lower limb, including hip    Neoplasm of uncertain behavior of skin    Melanocytic nevi of unspecified upper limb, including shoulder    Other melanin hyperpigmentation    Other seborrheic keratosis    Papule of skin    Patella fracture    Pelvic mass    PFO (patent foramen ovale)    Prurigo nodularis    Scar condition and fibrosis of skin    Skin changes due to chronic exposure to nonionizing radiation, unspecified    Sprain of wrist    Word finding difficulty             Jacob Steve MD, FACC   [facility] / NO /  Cardiology      Of Note:  [employer] voice recognition dictation software was utilized partially in the preparation of this note, therefore, inaccuracies in spelling, word choice and punctuation may have occurred which were not recognized the time of signing.    Patient was seen and examined with total time of visit including chart preparation, rooming, and chart completion exceeding 40 minutes.

## 2024-03-03 RX ORDER — SODIUM, POTASSIUM,MAG SULFATES 17.5-3.13G
SOLUTION, RECONSTITUTED, ORAL ORAL
Qty: 1 EACH | Refills: 0 | Status: SHIPPED | OUTPATIENT
Start: 2024-03-03 | End: 2024-06-04 | Stop reason: ALTCHOICE

## 2024-03-06 ENCOUNTER — TELEPHONE (OUTPATIENT)
Dept: CARDIOLOGY | Facility: CLINIC | Age: 69
End: 2024-03-06
Payer: COMMERCIAL

## 2024-03-06 NOTE — TELEPHONE ENCOUNTER
Pt calls in states that she did not receive med from last Office visit. States that there was issues with the pharmacy. Pt has complaints of neck pain that she spoke with Dr. Jacob Steve MD, [facility] previously. Message forwarded to Sharla. Pt requesting call back. Cecile

## 2024-03-08 NOTE — TELEPHONE ENCOUNTER
Called patient on 3/8/24 at 12:30pm left detailed message to call office back to assit left phone number of [phone] and to ask for Leslee

## 2024-03-11 ENCOUNTER — TELEPHONE (OUTPATIENT)
Dept: CARDIOLOGY | Facility: CLINIC | Age: 69
End: 2024-03-11
Payer: COMMERCIAL

## 2024-03-11 NOTE — TELEPHONE ENCOUNTER
Pt left vmm stating she has a questions about colonoscopy.     Called to patient, no answer and vmm full.

## 2024-03-12 ENCOUNTER — APPOINTMENT (OUTPATIENT)
Dept: GASTROENTEROLOGY | Facility: HOSPITAL | Age: 69
End: 2024-03-12
Payer: COMMERCIAL

## 2024-03-12 ENCOUNTER — ANESTHESIA (OUTPATIENT)
Dept: GASTROENTEROLOGY | Facility: HOSPITAL | Age: 69
End: 2024-03-12
Payer: COMMERCIAL

## 2024-03-12 ENCOUNTER — ANESTHESIA EVENT (OUTPATIENT)
Dept: GASTROENTEROLOGY | Facility: HOSPITAL | Age: 69
End: 2024-03-12

## 2024-03-15 ENCOUNTER — TELEPHONE (OUTPATIENT)
Dept: GASTROENTEROLOGY | Facility: CLINIC | Age: 69
End: 2024-03-15
Payer: COMMERCIAL

## 2024-03-28 ENCOUNTER — HOSPITAL ENCOUNTER (OUTPATIENT)
Dept: CARDIOLOGY | Facility: CLINIC | Age: 69
Discharge: HOME | End: 2024-03-28
Payer: COMMERCIAL

## 2024-03-28 ENCOUNTER — APPOINTMENT (OUTPATIENT)
Dept: CARDIOLOGY | Facility: CLINIC | Age: 69
End: 2024-03-28
Payer: COMMERCIAL

## 2024-03-28 ENCOUNTER — HOSPITAL ENCOUNTER (OUTPATIENT)
Dept: VASCULAR MEDICINE | Facility: CLINIC | Age: 69
Discharge: HOME | End: 2024-03-28
Payer: COMMERCIAL

## 2024-03-28 DIAGNOSIS — K21.9 GASTROESOPHAGEAL REFLUX DISEASE WITHOUT ESOPHAGITIS: ICD-10-CM

## 2024-03-28 DIAGNOSIS — R00.0 SINUS TACHYCARDIA: ICD-10-CM

## 2024-03-28 DIAGNOSIS — Q21.12 PFO (PATENT FORAMEN OVALE) (HHS-HCC): ICD-10-CM

## 2024-03-28 DIAGNOSIS — R41.3 MEMORY DEFICIT: ICD-10-CM

## 2024-03-28 DIAGNOSIS — G43.909 MIGRAINE WITHOUT STATUS MIGRAINOSUS, NOT INTRACTABLE, UNSPECIFIED MIGRAINE TYPE: ICD-10-CM

## 2024-03-28 DIAGNOSIS — M54.50 CHRONIC LOW BACK PAIN, UNSPECIFIED BACK PAIN LATERALITY, UNSPECIFIED WHETHER SCIATICA PRESENT: ICD-10-CM

## 2024-03-28 DIAGNOSIS — R09.89 BILATERAL CAROTID BRUITS: ICD-10-CM

## 2024-03-28 DIAGNOSIS — Z86.73 HISTORY OF CVA (CEREBROVASCULAR ACCIDENT): ICD-10-CM

## 2024-03-28 DIAGNOSIS — J45.909 MILD ASTHMA, UNSPECIFIED WHETHER COMPLICATED, UNSPECIFIED WHETHER PERSISTENT (HHS-HCC): ICD-10-CM

## 2024-03-28 DIAGNOSIS — R42 LIGHT HEADEDNESS: ICD-10-CM

## 2024-03-28 DIAGNOSIS — E03.9 ACQUIRED HYPOTHYROIDISM: ICD-10-CM

## 2024-03-28 DIAGNOSIS — M54.2 NECK PAIN: ICD-10-CM

## 2024-03-28 DIAGNOSIS — R06.09 DOE (DYSPNEA ON EXERTION): ICD-10-CM

## 2024-03-28 DIAGNOSIS — G89.29 CHRONIC LOW BACK PAIN, UNSPECIFIED BACK PAIN LATERALITY, UNSPECIFIED WHETHER SCIATICA PRESENT: ICD-10-CM

## 2024-03-28 DIAGNOSIS — I67.89 ACUTE, BUT ILL-DEFINED, CEREBROVASCULAR DISEASE: ICD-10-CM

## 2024-03-28 DIAGNOSIS — F41.9 ANXIETY: ICD-10-CM

## 2024-03-28 DIAGNOSIS — R61 DIAPHORESIS: ICD-10-CM

## 2024-03-28 DIAGNOSIS — R00.2 PALPITATIONS: ICD-10-CM

## 2024-03-28 DIAGNOSIS — I10 HYPERTENSION, ESSENTIAL, BENIGN: ICD-10-CM

## 2024-03-28 PROCEDURE — 93225 XTRNL ECG REC<48 HRS REC: CPT

## 2024-03-28 PROCEDURE — 93227 XTRNL ECG REC<48 HR R&I: CPT | Performed by: INTERNAL MEDICINE

## 2024-03-28 PROCEDURE — 93880 EXTRACRANIAL BILAT STUDY: CPT

## 2024-03-28 PROCEDURE — 93880 EXTRACRANIAL BILAT STUDY: CPT | Performed by: SURGERY

## 2024-04-05 ENCOUNTER — OFFICE VISIT (OUTPATIENT)
Dept: PRIMARY CARE | Facility: CLINIC | Age: 69
End: 2024-04-05
Payer: COMMERCIAL

## 2024-04-05 VITALS
WEIGHT: 212 LBS | OXYGEN SATURATION: 96 % | SYSTOLIC BLOOD PRESSURE: 126 MMHG | BODY MASS INDEX: 31.31 KG/M2 | HEART RATE: 96 BPM | DIASTOLIC BLOOD PRESSURE: 80 MMHG

## 2024-04-05 DIAGNOSIS — M54.2 NECK PAIN: ICD-10-CM

## 2024-04-05 DIAGNOSIS — F41.9 ANXIETY: ICD-10-CM

## 2024-04-05 DIAGNOSIS — M79.10 MYALGIA: Primary | ICD-10-CM

## 2024-04-05 PROCEDURE — 99214 OFFICE O/P EST MOD 30 MIN: CPT | Performed by: FAMILY MEDICINE

## 2024-04-05 PROCEDURE — 1036F TOBACCO NON-USER: CPT | Performed by: FAMILY MEDICINE

## 2024-04-05 PROCEDURE — 3074F SYST BP LT 130 MM HG: CPT | Performed by: FAMILY MEDICINE

## 2024-04-05 PROCEDURE — 1159F MED LIST DOCD IN RCRD: CPT | Performed by: FAMILY MEDICINE

## 2024-04-05 PROCEDURE — 3079F DIAST BP 80-89 MM HG: CPT | Performed by: FAMILY MEDICINE

## 2024-04-05 PROCEDURE — 1160F RVW MEDS BY RX/DR IN RCRD: CPT | Performed by: FAMILY MEDICINE

## 2024-04-05 RX ORDER — CYCLOBENZAPRINE HCL 10 MG
10 TABLET ORAL 3 TIMES DAILY PRN
Qty: 30 TABLET | Refills: 1 | Status: SHIPPED | OUTPATIENT
Start: 2024-04-05 | End: 2024-06-04

## 2024-04-05 RX ORDER — PAROXETINE HYDROCHLORIDE 40 MG/1
40 TABLET, FILM COATED ORAL DAILY
Qty: 90 TABLET | Refills: 1 | Status: SHIPPED | OUTPATIENT
Start: 2024-04-05

## 2024-04-05 ASSESSMENT — ENCOUNTER SYMPTOMS
LOSS OF SENSATION IN FEET: 0
OCCASIONAL FEELINGS OF UNSTEADINESS: 0
DEPRESSION: 0

## 2024-04-05 NOTE — PATIENT INSTRUCTIONS
Stop the atorvastatin.  Add Magnesium 400mg daily.  Use the cyclobenzaprine and tylenol as needed for the pain.    If you are not feeing at least 50% better in one week, get the labs done.

## 2024-04-05 NOTE — PROGRESS NOTES
Subjective   Patient ID: Rosie Huynh is a 68 y.o. female who presents for Dysmenorrhea (Patient has had cramps all over her body the come at random times. Her muscle spasms are getting worse as well. ).    She definitely think she is having pain from taking a statin.  She had not been taking it but then started taking it regularly.  She feels that she is in terrible pain and is really affecting everything she does.  No injury, no ill symptoms.    Histories reviewed      Objective   /80   Pulse 96   Wt 96.2 kg (212 lb)   LMP  (LMP Unknown)   SpO2 96%   BMI 31.31 kg/m²    Physical Exam    Alert adult, NAD  Affect pleasant  Heart RRR no murmur  Lungs CTA bilat  She is tender in her large muscle groups of her arms and legs  Gait is normal    Problem List Items Addressed This Visit             ICD-10-CM    Anxiety F41.9    Relevant Medications    PARoxetine (Paxil) 40 mg tablet    Neck pain M54.2    Relevant Medications    cyclobenzaprine (Flexeril) 10 mg tablet    Other Relevant Orders    Sedimentation Rate (Completed)    C-reactive protein (Completed)    Myalgia - Primary M79.10    Relevant Medications    cyclobenzaprine (Flexeril) 10 mg tablet    Other Relevant Orders    Sedimentation Rate (Completed)    C-reactive protein (Completed)     This could be statin myalgias so she will stop the statin immediately.  We will check some lab work and I encouraged her to increase her fluid intake.  She also needs a refill on her anxiety medicine today.

## 2024-04-08 ENCOUNTER — TELEPHONE (OUTPATIENT)
Dept: PRIMARY CARE | Facility: CLINIC | Age: 69
End: 2024-04-08
Payer: COMMERCIAL

## 2024-04-08 DIAGNOSIS — Z29.89 INDICATION PRESENT FOR ENDOCARDITIS PROPHYLAXIS: Primary | ICD-10-CM

## 2024-04-08 NOTE — TELEPHONE ENCOUNTER
Patient called office stating that she is having her teeth cleaned and needs antibiotics prior to her appt. She is asking if antibiotics can be sent to her local pharmacy? She forgot to ask at her last OV 4/5/24.

## 2024-04-09 RX ORDER — AMOXICILLIN 500 MG/1
CAPSULE ORAL
Qty: 4 CAPSULE | Refills: 1 | Status: SHIPPED | OUTPATIENT
Start: 2024-04-09

## 2024-04-09 NOTE — TELEPHONE ENCOUNTER
Let pt know Rx has been sent, with one refill in case she needs to go back for another procedure.  Katherine Olmos MD

## 2024-04-09 NOTE — TELEPHONE ENCOUNTER
I left a vm letting pt know the RX was sent to pharmacy with 1 refill, in case she has to go back to another procedure and repeated that in the vm along w/calling office if have any questions.

## 2024-04-10 ENCOUNTER — TELEPHONE (OUTPATIENT)
Dept: GASTROENTEROLOGY | Facility: CLINIC | Age: 69
End: 2024-04-10
Payer: COMMERCIAL

## 2024-04-10 NOTE — TELEPHONE ENCOUNTER
Attempted to speak with patient in regards to scheduling procedure. VM left for patient to return call to office at earliest convenience.

## 2024-04-10 NOTE — TELEPHONE ENCOUNTER
Patient called office back stating that she has an infection in her tooth and states that the 4 pills are for preventative but with the infection, she needs something to clear the infection before she can have the cleaning and procedure done if needed.

## 2024-04-11 ENCOUNTER — OFFICE VISIT (OUTPATIENT)
Dept: CARDIOLOGY | Facility: CLINIC | Age: 69
End: 2024-04-11
Payer: COMMERCIAL

## 2024-04-11 ENCOUNTER — LAB (OUTPATIENT)
Dept: LAB | Facility: LAB | Age: 69
End: 2024-04-11
Payer: COMMERCIAL

## 2024-04-11 VITALS
HEIGHT: 69 IN | HEART RATE: 105 BPM | SYSTOLIC BLOOD PRESSURE: 120 MMHG | BODY MASS INDEX: 31.55 KG/M2 | OXYGEN SATURATION: 98 % | DIASTOLIC BLOOD PRESSURE: 80 MMHG | WEIGHT: 213 LBS

## 2024-04-11 DIAGNOSIS — R61 DIAPHORESIS: ICD-10-CM

## 2024-04-11 DIAGNOSIS — I10 HYPERTENSION, ESSENTIAL, BENIGN: ICD-10-CM

## 2024-04-11 DIAGNOSIS — R42 LIGHT HEADEDNESS: ICD-10-CM

## 2024-04-11 DIAGNOSIS — M79.10 MYALGIA: ICD-10-CM

## 2024-04-11 DIAGNOSIS — E03.9 ACQUIRED HYPOTHYROIDISM: ICD-10-CM

## 2024-04-11 DIAGNOSIS — R00.2 PALPITATIONS: ICD-10-CM

## 2024-04-11 DIAGNOSIS — I63.9 CEREBELLAR INFARCTION (MULTI): ICD-10-CM

## 2024-04-11 DIAGNOSIS — R41.3 MEMORY DEFICIT: ICD-10-CM

## 2024-04-11 DIAGNOSIS — Z86.73 HISTORY OF CVA (CEREBROVASCULAR ACCIDENT): ICD-10-CM

## 2024-04-11 DIAGNOSIS — J45.909 MILD ASTHMA, UNSPECIFIED WHETHER COMPLICATED, UNSPECIFIED WHETHER PERSISTENT (HHS-HCC): ICD-10-CM

## 2024-04-11 DIAGNOSIS — I67.89 ACUTE, BUT ILL-DEFINED, CEREBROVASCULAR DISEASE: ICD-10-CM

## 2024-04-11 DIAGNOSIS — Q21.12 PFO (PATENT FORAMEN OVALE) (HHS-HCC): Primary | ICD-10-CM

## 2024-04-11 DIAGNOSIS — R09.89 BILATERAL CAROTID BRUITS: ICD-10-CM

## 2024-04-11 DIAGNOSIS — R06.09 DOE (DYSPNEA ON EXERTION): ICD-10-CM

## 2024-04-11 DIAGNOSIS — G43.909 MIGRAINE WITHOUT STATUS MIGRAINOSUS, NOT INTRACTABLE, UNSPECIFIED MIGRAINE TYPE: ICD-10-CM

## 2024-04-11 DIAGNOSIS — R00.0 SINUS TACHYCARDIA: ICD-10-CM

## 2024-04-11 DIAGNOSIS — K21.9 GASTROESOPHAGEAL REFLUX DISEASE WITHOUT ESOPHAGITIS: ICD-10-CM

## 2024-04-11 DIAGNOSIS — G89.29 CHRONIC LOW BACK PAIN, UNSPECIFIED BACK PAIN LATERALITY, UNSPECIFIED WHETHER SCIATICA PRESENT: ICD-10-CM

## 2024-04-11 DIAGNOSIS — Q21.12 PFO (PATENT FORAMEN OVALE) (HHS-HCC): ICD-10-CM

## 2024-04-11 DIAGNOSIS — F41.9 ANXIETY: ICD-10-CM

## 2024-04-11 DIAGNOSIS — M54.2 NECK PAIN: ICD-10-CM

## 2024-04-11 DIAGNOSIS — M54.50 CHRONIC LOW BACK PAIN, UNSPECIFIED BACK PAIN LATERALITY, UNSPECIFIED WHETHER SCIATICA PRESENT: ICD-10-CM

## 2024-04-11 LAB
ALBUMIN SERPL BCP-MCNC: 4.3 G/DL (ref 3.4–5)
ALP SERPL-CCNC: 111 U/L (ref 33–136)
ALT SERPL W P-5'-P-CCNC: 17 U/L (ref 7–45)
ANION GAP SERPL CALC-SCNC: 14 MMOL/L (ref 10–20)
AST SERPL W P-5'-P-CCNC: 20 U/L (ref 9–39)
BILIRUB DIRECT SERPL-MCNC: 0.1 MG/DL (ref 0–0.3)
BILIRUB SERPL-MCNC: 0.6 MG/DL (ref 0–1.2)
BUN SERPL-MCNC: 17 MG/DL (ref 6–23)
CALCIUM SERPL-MCNC: 9.2 MG/DL (ref 8.6–10.3)
CHLORIDE SERPL-SCNC: 101 MMOL/L (ref 98–107)
CO2 SERPL-SCNC: 27 MMOL/L (ref 21–32)
CREAT SERPL-MCNC: 0.86 MG/DL (ref 0.5–1.05)
CRP SERPL-MCNC: 1.84 MG/DL
EGFRCR SERPLBLD CKD-EPI 2021: 74 ML/MIN/1.73M*2
ERYTHROCYTE [DISTWIDTH] IN BLOOD BY AUTOMATED COUNT: 12.4 % (ref 11.5–14.5)
ERYTHROCYTE [SEDIMENTATION RATE] IN BLOOD BY WESTERGREN METHOD: 50 MM/H (ref 0–30)
GLUCOSE SERPL-MCNC: 98 MG/DL (ref 74–99)
HCT VFR BLD AUTO: 39.6 % (ref 36–46)
HGB BLD-MCNC: 12.6 G/DL (ref 12–16)
MAGNESIUM SERPL-MCNC: 2.23 MG/DL (ref 1.6–2.4)
MCH RBC QN AUTO: 29.2 PG (ref 26–34)
MCHC RBC AUTO-ENTMCNC: 31.8 G/DL (ref 32–36)
MCV RBC AUTO: 92 FL (ref 80–100)
NRBC BLD-RTO: 0 /100 WBCS (ref 0–0)
PLATELET # BLD AUTO: 402 X10*3/UL (ref 150–450)
POTASSIUM SERPL-SCNC: 4.8 MMOL/L (ref 3.5–5.3)
PROT SERPL-MCNC: 7.2 G/DL (ref 6.4–8.2)
RBC # BLD AUTO: 4.32 X10*6/UL (ref 4–5.2)
SODIUM SERPL-SCNC: 137 MMOL/L (ref 136–145)
TSH SERPL-ACNC: 1.86 MIU/L (ref 0.44–3.98)
WBC # BLD AUTO: 9.6 X10*3/UL (ref 4.4–11.3)

## 2024-04-11 PROCEDURE — 3079F DIAST BP 80-89 MM HG: CPT | Performed by: INTERNAL MEDICINE

## 2024-04-11 PROCEDURE — 80053 COMPREHEN METABOLIC PANEL: CPT

## 2024-04-11 PROCEDURE — 83735 ASSAY OF MAGNESIUM: CPT

## 2024-04-11 PROCEDURE — 36415 COLL VENOUS BLD VENIPUNCTURE: CPT

## 2024-04-11 PROCEDURE — 82248 BILIRUBIN DIRECT: CPT

## 2024-04-11 PROCEDURE — 3074F SYST BP LT 130 MM HG: CPT | Performed by: INTERNAL MEDICINE

## 2024-04-11 PROCEDURE — 85652 RBC SED RATE AUTOMATED: CPT

## 2024-04-11 PROCEDURE — 99214 OFFICE O/P EST MOD 30 MIN: CPT | Performed by: INTERNAL MEDICINE

## 2024-04-11 PROCEDURE — 84443 ASSAY THYROID STIM HORMONE: CPT

## 2024-04-11 PROCEDURE — 85027 COMPLETE CBC AUTOMATED: CPT

## 2024-04-11 PROCEDURE — 1159F MED LIST DOCD IN RCRD: CPT | Performed by: INTERNAL MEDICINE

## 2024-04-11 PROCEDURE — 86140 C-REACTIVE PROTEIN: CPT

## 2024-04-11 NOTE — PROGRESS NOTES
CARDIOLOGY OFFICE NOTE     Date:   4/11/2024    Patient:    Rosie Huynh    YOB: 1955    Primary Physician: Katherine Olmos MD       Reason for Visit: Follow-up visit, testing results.       HPI:     Rosie Huynh was seen in cardiac evaluation at the  Cardiology office April 11, 2024.      The patients problems are listed as in the impression below.    Electronic medical records reviewed.    Patient returns.  Overall she states that she still has muscle aches myalgias.  She discontinued her statins 1 week ago.  She still has some symptomatology.  She otherwise is unchanged in her symptomatology.    Insurance denied her limited echocardiogram with bubble studies.  This was being done to rule out possible PFO, ASD or VSD as a cause of her symptoms.  Carotid ultrasounds were negative.  48-hour Holter monitor noted no significant dysrhythmias.  See below for details.    Patient denies  TIA or CVA symptoms.  No CHF or Edema.   No GI,  or Bleeding Issues. No Recent Fever or Chills.     Cardiovascular and general review of systems is otherwise negative.    A 14-system review is otherwise negative, other than noted.     PHYSICAL EXAMINATION:      Vitals:    04/11/24 1452   BP: 120/80   Pulse: 105   SpO2: 98%     General: No acute distress. Vital signs as noted. Alert and oriented.  Head And Neck Examination: No jugular venous distention, no carotid bruits, no mass. Carotid upstrokes preserved. Oral mucosa moist.  No xanthelasma. Head and neck examination otherwise unremarkable.  Lungs: Clear to auscultation and percussion. No wheezes, no rales,  and no rhonchi.  Chest: Excursion appeared to be normal. No chest wall tenderness on palpation.  Heart: Normal S1 and S2. No S3. No S4. No rub. Grade 1/6 systolic murmur, best heard at the left sternal border. Point of maximal impulse was within normal limits.  Abdomen: Soft. Nontender. No organomegaly. No bruits. No masses. Obese.  Extremities: No  bipedal edema. No clubbing. No cyanosis.  Pulses are strong throughout. No bruits.  Musculoskeletal Exam: No ulcers, otherwise unremarkable.  Neuro: Neurologically appeared grossly intact.  .  IMPRESSION:      Myalgias, possibly secondary to statin use.   Chest pain  Dyspnea on exertion  Neck discomfort  Back discomfort  Palpitations  Lightheadedness, dizziness  Diaphoresis episodes  Sinus tachycardia  Normal LV systolic function, LVEF 64% 9/2023  Negative nuclear stress test, 9/2023  Patent foramen ovale, post repair with helix, 2010  History of prior stroke, 2010  Migraine headache history  Hypertension  Hyperlipidemia  Asthma  GERD  Hypothyroidism  Degenerative joint disease  Chronic lower back pain  Anxiety  Prior hysterectomy  Family history of coronary artery disease  Otherwise as per assessment below.    RECOMMENDATIONS:      Patient will hold her statins at this time.  Restart if her symptoms resolve to see if myalgias are truly statin related.    Patient will continue other medications.  Refills were provided.    Exercise dietary program.  Hydration.    [employer] portal use was encouraged.    We will plan to see back in 6 months with echocardiogram (limited with bubble study), CT calcium scoring, laboratory Studies and ECG as ordered.     Patient will follow up with their primary physician for general care.    The patient knows to contact medical care earlier if need be.      ALLERGIES:     Allergies   Allergen Reactions    Cat Hair Standardized Allergenic Extract Itching    Mold Itching    Latex Unknown    Other Other        MEDICATIONS:     Current Outpatient Medications   Medication Instructions    albuterol 90 mcg/actuation inhaler inhalation    amoxicillin (Amoxil) 500 mg capsule 2000 mg po one dose prior to procedure    aspirin 81 mg EC tablet 1 tablet, oral, Daily    atorvastatin (LIPITOR) 40 mg, oral, Daily    cyanocobalamin (Vitamin B-12) 50 mcg tablet 1 tablet, oral, Daily    cyclobenzaprine  (FLEXERIL) 10 mg, oral, 3 times daily PRN    lisinopril 10 mg, oral, Daily    metoprolol succinate XL (TOPROL-XL) 25 mg, oral, Daily, Do not crush or chew.    omeprazole (PRILOSEC) 20 mg, oral, Daily    PARoxetine (PAXIL) 40 mg, oral, Daily    pyridoxine (Vitamin B-6) 50 mg tablet 1 tablet, oral, Daily    sodium,potassium,mag sulfates (Suprep) 17.5-3.13-1.6 gram recon soln solution Take one bottle twice as directed by the prep instructions       ELECTROCARDIOGRAM:      None this visit    CARDIAC TESTING:      Carotid ultrasound: Less than 50% bilateral ICA    48-hour Holter monitor: Predominant sinus rhythm.  Average heart rate 85 beats minute.  Minimum maximum heart rates 63 and 126 beats minute respectively.  No significant dysrhythmias.  No atrial fibrillation.    LABORATORY DATA:      CBC:   Lab Results   Component Value Date    WBC 7.0 08/24/2023    RBC 3.92 (L) 08/24/2023    HGB 11.6 (L) 08/24/2023    HCT 35.3 (L) 08/24/2023     08/24/2023        CMP:    Lab Results   Component Value Date     08/24/2023    K 4.3 08/24/2023     08/24/2023    CO2 26 08/24/2023    BUN 19 08/24/2023    CREATININE 0.71 08/24/2023    GLUCOSE 94 08/24/2023    CALCIUM 8.6 08/24/2023       Magnesium:    Lab Results   Component Value Date    MG 2.21 08/24/2023       Lipid Profile:    Lab Results   Component Value Date    CHOL 159 08/24/2023    TRIG 158 (H) 08/24/2023    HDL 37.1 (A) 08/24/2023    LDLF 90 08/24/2023       Hepatic Function Panel:    Lab Results   Component Value Date    ALKPHOS 65 08/24/2023    ALT 19 08/24/2023    AST 20 08/24/2023    PROT 6.2 (L) 08/24/2023    BILITOT 0.5 08/24/2023    BILIDIR 0.1 07/14/2023       TSH:    Lab Results   Component Value Date    TSH 2.07 10/11/2022       HgBA1c:    Lab Results   Component Value Date    HGBA1C 5.2 08/24/2023       BNP:   Lab Results   Component Value Date    BNP 13 08/23/2023        PT/INR:    Lab Results   Component Value Date    PROTIME 12.7 08/24/2023     INR 1.1 08/24/2023       Cardiac Enzymes:    Lab Results   Component Value Date    TROPHS <3 08/24/2023    TROPHS <3 08/23/2023    TROPHS <3 08/23/2023                   PROBLEM LIST:     Patient Active Problem List   Diagnosis    Anxiety    Current moderate episode of major depressive disorder without prior episode (CMS/HCC)    Gastroesophageal reflux disease without esophagitis    History of CVA (cerebrovascular accident)    Hypertension, essential, benign    Memory deficit    Mild intermittent asthma without complication    Moderate episode of recurrent major depressive disorder (CMS/HCC)    Migraine without status migrainosus, not intractable    Depressive disorder    Acute pain of right knee    Acute, but ill-defined, cerebrovascular disease    Asthma    Basal cell carcinoma of skin of other parts of face    Cerebellar infarction (CMS/HCC)    Change in bowel movement    Closed fracture of distal end of radius    Complex cyst of uterine adnexa    Effusion of right knee    Hemangioma of skin and subcutaneous tissue    Hypothyroidism    Lack of coordination    Low back pain    Melanocytic nevi of trunk    Melanocytic nevi of unspecified lower limb, including hip    Neoplasm of uncertain behavior of skin    Melanocytic nevi of unspecified upper limb, including shoulder    Other melanin hyperpigmentation    Other seborrheic keratosis    Papule of skin    Patella fracture    Pelvic mass    PFO (patent foramen ovale)    Prurigo nodularis    Scar condition and fibrosis of skin    Skin changes due to chronic exposure to nonionizing radiation, unspecified    Sprain of wrist    Word finding difficulty    Palpitations    FLORES (dyspnea on exertion)    Neck pain    Bilateral carotid bruits    Light headedness    Diaphoresis             Jacob Steve MD, FACC   [facility] / NO /  Cardiology      Of Note:  [employer] voice recognition dictation software was utilized partially in the preparation of this note, therefore,  inaccuracies in spelling, word choice and punctuation may have occurred which were not recognized the time of signing.    Patient was seen and examined with total time of visit including chart preparation, rooming, and chart completion exceeding 40 minutes.      ----

## 2024-04-11 NOTE — TELEPHONE ENCOUNTER
Rx for new antibiotics for new infection requires an appointment.  If she had this an infection when I saw her, why did we talk about it?  If she needs treatment dosing, give her an appointment for virtual.  Katherine Olmos MD

## 2024-04-11 NOTE — PATIENT INSTRUCTIONS
Exercise diet weight loss program.    Hydrate    Use My Chart portal for reviewing records, testing and contacting office.     Hold Statin treatment for 8 weeks.  If resolves, good, but retry statin medication to see if returns,   if doesn't then was not due to statins in first place.  Let us know how goes.    Get CT  Calcium scoring and Echo with bubble study just prior to next visit.

## 2024-04-12 ENCOUNTER — TELEPHONE (OUTPATIENT)
Dept: CARDIOLOGY | Facility: CLINIC | Age: 69
End: 2024-04-12
Payer: COMMERCIAL

## 2024-04-15 ENCOUNTER — OFFICE VISIT (OUTPATIENT)
Dept: PRIMARY CARE | Facility: CLINIC | Age: 69
End: 2024-04-15
Payer: COMMERCIAL

## 2024-04-15 VITALS — DIASTOLIC BLOOD PRESSURE: 92 MMHG | OXYGEN SATURATION: 96 % | SYSTOLIC BLOOD PRESSURE: 132 MMHG | HEART RATE: 86 BPM

## 2024-04-15 DIAGNOSIS — M79.10 MYALGIA DUE TO STATIN: Primary | ICD-10-CM

## 2024-04-15 DIAGNOSIS — T46.6X5A MYALGIA DUE TO STATIN: Primary | ICD-10-CM

## 2024-04-15 PROCEDURE — 3075F SYST BP GE 130 - 139MM HG: CPT | Performed by: FAMILY MEDICINE

## 2024-04-15 PROCEDURE — 1036F TOBACCO NON-USER: CPT | Performed by: FAMILY MEDICINE

## 2024-04-15 PROCEDURE — 1159F MED LIST DOCD IN RCRD: CPT | Performed by: FAMILY MEDICINE

## 2024-04-15 PROCEDURE — 3080F DIAST BP >= 90 MM HG: CPT | Performed by: FAMILY MEDICINE

## 2024-04-15 PROCEDURE — 1160F RVW MEDS BY RX/DR IN RCRD: CPT | Performed by: FAMILY MEDICINE

## 2024-04-15 PROCEDURE — 99213 OFFICE O/P EST LOW 20 MIN: CPT | Performed by: FAMILY MEDICINE

## 2024-04-15 RX ORDER — PREDNISONE 20 MG/1
60 TABLET ORAL DAILY
Qty: 15 TABLET | Refills: 0 | Status: SHIPPED | OUTPATIENT
Start: 2024-04-15 | End: 2024-04-20

## 2024-04-15 ASSESSMENT — ENCOUNTER SYMPTOMS
OCCASIONAL FEELINGS OF UNSTEADINESS: 0
DEPRESSION: 0
LOSS OF SENSATION IN FEET: 0

## 2024-04-15 NOTE — PROGRESS NOTES
Subjective   Patient ID: Rosie Huynh is a 68 y.o. female who presents for review labs (Patient is here to go over lab work. She would also like to address that her pain all over her body has not subsided since her last visit. She did not want to step onto scale. ).    She stopped her statin 1 week ago but the pain is only getting worst.  The pain today is back and front of legs, arms, neck and back.  Nothing helps, even Xtra strength excedrin.  No new supplements.        Histories reviewed      Objective   BP (!) 132/92   Pulse 86   LMP  (LMP Unknown)   SpO2 96%    Physical Exam  Alert adult woman in no acute distress but a little slow to move around due to pain  Heart RRR no murmur  Lungs CTA bilat  She is tender on her thighs and upper arms    Labs reviewed showing an elevated CRP and ESR.  BMP and hepatic function as well as other labs normal.    Problem List Items Addressed This Visit    None  Visit Diagnoses         Codes    Myalgia due to statin    -  Primary M79.10, T46.6X5A          Stopping the statin should resolve the symptoms.  We will do a short burst of prednisone to help with the pain and if her symptoms or not relieved within the next week she should let me know.

## 2024-04-17 ENCOUNTER — TELEPHONE (OUTPATIENT)
Dept: PRIMARY CARE | Facility: CLINIC | Age: 69
End: 2024-04-17
Payer: COMMERCIAL

## 2024-04-17 NOTE — TELEPHONE ENCOUNTER
Pt wanted to let PCP know that she is feeling much better after just 1 dose of starting the Prednisone and thanks you for helping her pain.

## 2024-04-26 ENCOUNTER — OFFICE VISIT (OUTPATIENT)
Dept: PRIMARY CARE | Facility: CLINIC | Age: 69
End: 2024-04-26
Payer: COMMERCIAL

## 2024-04-26 VITALS
OXYGEN SATURATION: 96 % | BODY MASS INDEX: 31.45 KG/M2 | HEART RATE: 101 BPM | WEIGHT: 213 LBS | DIASTOLIC BLOOD PRESSURE: 91 MMHG | SYSTOLIC BLOOD PRESSURE: 142 MMHG

## 2024-04-26 DIAGNOSIS — E78.2 MIXED HYPERLIPIDEMIA: ICD-10-CM

## 2024-04-26 DIAGNOSIS — M79.10 MYALGIA: ICD-10-CM

## 2024-04-26 DIAGNOSIS — M25.511 ACUTE PAIN OF BOTH SHOULDERS: ICD-10-CM

## 2024-04-26 DIAGNOSIS — M25.552 BILATERAL HIP PAIN: Primary | ICD-10-CM

## 2024-04-26 DIAGNOSIS — R79.82 ELEVATED C-REACTIVE PROTEIN (CRP): ICD-10-CM

## 2024-04-26 DIAGNOSIS — M35.3 POLYMYALGIA RHEUMATICA (MULTI): ICD-10-CM

## 2024-04-26 DIAGNOSIS — Z86.73 HISTORY OF CVA (CEREBROVASCULAR ACCIDENT): ICD-10-CM

## 2024-04-26 DIAGNOSIS — M25.512 ACUTE PAIN OF BOTH SHOULDERS: ICD-10-CM

## 2024-04-26 DIAGNOSIS — M25.551 BILATERAL HIP PAIN: Primary | ICD-10-CM

## 2024-04-26 PROCEDURE — 3077F SYST BP >= 140 MM HG: CPT | Performed by: FAMILY MEDICINE

## 2024-04-26 PROCEDURE — 1160F RVW MEDS BY RX/DR IN RCRD: CPT | Performed by: FAMILY MEDICINE

## 2024-04-26 PROCEDURE — 1159F MED LIST DOCD IN RCRD: CPT | Performed by: FAMILY MEDICINE

## 2024-04-26 PROCEDURE — 3080F DIAST BP >= 90 MM HG: CPT | Performed by: FAMILY MEDICINE

## 2024-04-26 PROCEDURE — 1036F TOBACCO NON-USER: CPT | Performed by: FAMILY MEDICINE

## 2024-04-26 PROCEDURE — 99214 OFFICE O/P EST MOD 30 MIN: CPT | Performed by: FAMILY MEDICINE

## 2024-04-26 RX ORDER — EZETIMIBE 10 MG/1
10 TABLET ORAL DAILY
Qty: 30 TABLET | Refills: 5 | Status: SHIPPED | OUTPATIENT
Start: 2024-04-26 | End: 2024-10-23

## 2024-04-26 RX ORDER — PREDNISONE 10 MG/1
15 TABLET ORAL DAILY
Qty: 30 TABLET | Refills: 0 | Status: SHIPPED | OUTPATIENT
Start: 2024-04-26 | End: 2024-05-17 | Stop reason: SDUPTHER

## 2024-04-26 ASSESSMENT — ENCOUNTER SYMPTOMS
LOSS OF SENSATION IN FEET: 0
OCCASIONAL FEELINGS OF UNSTEADINESS: 0
DEPRESSION: 0

## 2024-04-26 NOTE — PROGRESS NOTES
"Subjective   Patient ID: Rosie Huynh is a 68 y.o. female who presents for Pain (Patient has pain all over her body. ).  After I last saw her she took the prednisone and felt 90% better within 2 days.  However with only a few days of prednisone, after she stopped it the pain came back quickly.  She continues to have pain in her lateral thighs and around her buttocks, her neck and now her shoulders.  She has some stiffness throughout the day and is now having trouble lifting her left arm above her head.  Nothing else seems to help.  The pain is bad enough that is causing her to walk more slowly and have a lot of trouble sleeping.  She feels like she just hurts \"all over.\"      Histories reviewed      Objective   BP (!) 142/91   Pulse 101   Wt 96.6 kg (213 lb)   LMP  (LMP Unknown)   SpO2 96%   BMI 31.45 kg/m²    Physical Exam    Alert adult woman, no acute distress but she is very slow walking secondary to pain  Both lateral hips are tender  Good range of motion of right shoulder but left shoulder has only about 25% of full abduction  No significant hand pain or swelling  Heart RRR no murmur  Lungs CTA bilat  No pedal edema    Problem List Items Addressed This Visit             ICD-10-CM    History of CVA (cerebrovascular accident) Z86.73    Myalgia M79.10    Relevant Orders    Referral to Rheumatology     Other Visit Diagnoses         Codes    Bilateral hip pain    -  Primary M25.551, M25.552    Relevant Medications    predniSONE (Deltasone) 10 mg tablet    Other Relevant Orders    Referral to Rheumatology    Acute pain of both shoulders     M25.511, M25.512    Relevant Orders    Referral to Rheumatology    Elevated C-reactive protein (CRP)     R79.82    Relevant Orders    Referral to Rheumatology    Polymyalgia rheumatica (Multi)     M35.3    Relevant Medications    predniSONE (Deltasone) 10 mg tablet    Other Relevant Orders    Referral to Rheumatology    Mixed hyperlipidemia     E78.2    Relevant " Medications    ezetimibe (Zetia) 10 mg tablet          I am very concerned that she might have polymyalgia rheumatica.  We went through the diagnostic criteria for this diagnosis I feel pretty sure but I explained to the patient I am not a rheumatologist so I cannot be sure.  Unfortunately takes months to get into see rheumatology so we will start her on low-dose prednisone now.  If she is not feeling significantly better in the next week I would like her to call me back.    Because of her history of stroke I am concerned about stopping the Lipitor given how high her cholesterol was, however with the myalgias I am not comfortable with her restarting again yet.  We will start Zetia, prescription sent

## 2024-05-02 ENCOUNTER — PATIENT MESSAGE (OUTPATIENT)
Dept: PRIMARY CARE | Facility: CLINIC | Age: 69
End: 2024-05-02
Payer: COMMERCIAL

## 2024-05-17 DIAGNOSIS — M25.551 BILATERAL HIP PAIN: ICD-10-CM

## 2024-05-17 DIAGNOSIS — M35.3 POLYMYALGIA RHEUMATICA (MULTI): ICD-10-CM

## 2024-05-17 DIAGNOSIS — M25.552 BILATERAL HIP PAIN: ICD-10-CM

## 2024-05-17 RX ORDER — PREDNISONE 10 MG/1
15 TABLET ORAL DAILY
Qty: 45 TABLET | Refills: 0 | Status: SHIPPED | OUTPATIENT
Start: 2024-05-17 | End: 2024-06-10 | Stop reason: ALTCHOICE

## 2024-05-17 NOTE — TELEPHONE ENCOUNTER
Patient left voicemail on provider refill line for:    Name of medication & dose:  predniSONE (Deltasone) 10 mg tablet   Quantity & refills requested: enough to hold her over for for her [facility] rhumatology appt 6/10/24    Amount of medication remainin days, but not enough for all weekend    If out of medication, for how long?      Last office visit:  24  Last labs (if applicable):    Future appointment?  N/a    Pharmacy verified.  [facility] in [place]  Allergies updated.       The patient was informed the requested medication request will be processed within 3 business days unless they are contacted by a staff member to advise otherwise.

## 2024-05-28 ENCOUNTER — APPOINTMENT (OUTPATIENT)
Dept: RADIOLOGY | Facility: HOSPITAL | Age: 69
End: 2024-05-28
Payer: COMMERCIAL

## 2024-05-28 ENCOUNTER — HOSPITAL ENCOUNTER (EMERGENCY)
Facility: HOSPITAL | Age: 69
Discharge: HOME | End: 2024-05-28
Attending: STUDENT IN AN ORGANIZED HEALTH CARE EDUCATION/TRAINING PROGRAM
Payer: COMMERCIAL

## 2024-05-28 VITALS
DIASTOLIC BLOOD PRESSURE: 89 MMHG | WEIGHT: 200 LBS | OXYGEN SATURATION: 98 % | RESPIRATION RATE: 18 BRPM | BODY MASS INDEX: 29.62 KG/M2 | SYSTOLIC BLOOD PRESSURE: 159 MMHG | HEART RATE: 80 BPM | HEIGHT: 69 IN | TEMPERATURE: 97.9 F
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
## 2024-05-28 PROCEDURE — 90471 IMMUNIZATION ADMIN: CPT | Performed by: EMERGENCY MEDICINE

## 2024-05-28 PROCEDURE — 2500000004 HC RX 250 GENERAL PHARMACY W/ HCPCS (ALT 636 FOR OP/ED): Performed by: EMERGENCY MEDICINE

## 2024-05-28 PROCEDURE — 73590 X-RAY EXAM OF LOWER LEG: CPT | Mod: RIGHT SIDE | Performed by: RADIOLOGY

## 2024-05-28 PROCEDURE — 73090 X-RAY EXAM OF FOREARM: CPT | Mod: RT

## 2024-05-28 PROCEDURE — 73630 X-RAY EXAM OF FOOT: CPT | Mod: RT

## 2024-05-28 PROCEDURE — 99284 EMERGENCY DEPT VISIT MOD MDM: CPT | Mod: 25

## 2024-05-28 PROCEDURE — 73630 X-RAY EXAM OF FOOT: CPT | Mod: RIGHT SIDE | Performed by: RADIOLOGY

## 2024-05-28 PROCEDURE — 73590 X-RAY EXAM OF LOWER LEG: CPT | Mod: RT

## 2024-05-28 PROCEDURE — 73130 X-RAY EXAM OF HAND: CPT | Mod: RT

## 2024-05-28 PROCEDURE — 73090 X-RAY EXAM OF FOREARM: CPT | Mod: RIGHT SIDE | Performed by: RADIOLOGY

## 2024-05-28 PROCEDURE — 90715 TDAP VACCINE 7 YRS/> IM: CPT | Performed by: EMERGENCY MEDICINE

## 2024-05-28 PROCEDURE — 73130 X-RAY EXAM OF HAND: CPT | Mod: RIGHT SIDE | Performed by: RADIOLOGY

## 2024-05-28 RX ADMIN — TETANUS TOXOID, REDUCED DIPHTHERIA TOXOID AND ACELLULAR PERTUSSIS VACCINE, ADSORBED 0.5 ML: 5; 2.5; 8; 8; 2.5 SUSPENSION INTRAMUSCULAR at 23:01

## 2024-05-28 ASSESSMENT — LIFESTYLE VARIABLES
HAVE YOU EVER FELT YOU SHOULD CUT DOWN ON YOUR DRINKING: NO
TOTAL SCORE: 0
EVER HAD A DRINK FIRST THING IN THE MORNING TO STEADY YOUR NERVES TO GET RID OF A HANGOVER: NO
HAVE PEOPLE ANNOYED YOU BY CRITICIZING YOUR DRINKING: NO
EVER FELT BAD OR GUILTY ABOUT YOUR DRINKING: NO

## 2024-05-28 ASSESSMENT — COLUMBIA-SUICIDE SEVERITY RATING SCALE - C-SSRS
1. IN THE PAST MONTH, HAVE YOU WISHED YOU WERE DEAD OR WISHED YOU COULD GO TO SLEEP AND NOT WAKE UP?: NO
6. HAVE YOU EVER DONE ANYTHING, STARTED TO DO ANYTHING, OR PREPARED TO DO ANYTHING TO END YOUR LIFE?: NO
2. HAVE YOU ACTUALLY HAD ANY THOUGHTS OF KILLING YOURSELF?: NO

## 2024-05-28 ASSESSMENT — PAIN - FUNCTIONAL ASSESSMENT: PAIN_FUNCTIONAL_ASSESSMENT: 0-10

## 2024-05-28 ASSESSMENT — PAIN SCALES - GENERAL: PAINLEVEL_OUTOF10: 0 - NO PAIN

## 2024-05-29 ENCOUNTER — APPOINTMENT (OUTPATIENT)
Dept: DERMATOLOGY | Facility: CLINIC | Age: 69
End: 2024-05-29
Payer: COMMERCIAL

## 2024-05-29 NOTE — DISCHARGE INSTRUCTIONS
Please return to the ER or seek immediate medical attention if you experience worsening headache, nausea, vomiting, fever of 38C (100.4) or higher, confusion, difficulty concentrating change in personality, difficulty waking from sleep, neck pain, fainting, seizure, or any new or worsening symptoms.    You are welcome back any time. Thank you for entrusting your care to us, I hope we made your visit as pleasant as possible. Wishing you well!    Dr. Jurado

## 2024-05-29 NOTE — ED PROVIDER NOTES
EMERGENCY DEPARTMENT ENCOUNTER      Pt Name: Rosie Huynh  MRN: [id number]  Birthdate 1955  Date of evaluation: 5/28/2024  Provider: Vernon Jurado DO    CHIEF COMPLAINT       Chief Complaint   Patient presents with    Fall     Pt had a mechanical fall today, no loc, no head injury, no thinners.  Pt is alert and oriented at this time.  Pt is complaining of right calf pain and a right knee abrasion     HISTORY OF PRESENT ILLNESS    Rosie Huynh is a 68 y.o. year old female who presents to the ER for mechanical ground-level fall.  States she was walking her dog, lost her footing on the edge of the sidewalk and landed in the grass.  Landed on her right side.  No head strike, no LOC.  She is experiencing pain in her right forearm, right hand, right shin and right foot.  Last tetanus shot unknown.  PMH anxiety, depression, polymyalgia rheumatica, HTN, CVA  PSH Helix device, tonsillectomy  NKDA  Denies tobacco alcohol or drug use     PAST MEDICAL HISTORY     Past Medical History:   Diagnosis Date    Abnormal computed tomography of pelvis 06/09/2023    Actinic keratoses 06/09/2023    Bleeding hemorrhoid 06/09/2023    Bowel incontinence 06/09/2023    Cerebral infarction due to thrombosis of other cerebral artery (Multi)     Cerebrovascular accident (CVA) due to thrombosis of other cerebral artery    Chronic constipation 06/09/2023    Closed fracture of left distal fibula 07/31/2018    Closed nondisplaced longitudinal fracture of right patella 07/30/2023    Fibroids 06/09/2023    History of basal cell cancer 06/09/2023    Hyperlipidemia 07/08/2010    Migraine with aura and without status migrainosus, not intractable 09/18/2015    Patent foramen ovale ([facility])     PFO (patent foramen ovale)    Personal history of other malignant neoplasm of skin     History of other malignant neoplasm of skin     CURRENT MEDICATIONS       Discharge Medication List as of 5/28/2024 10:49 PM        CONTINUE these medications  which have NOT CHANGED    Details   albuterol 90 mcg/actuation inhaler Inhale., Historical Med      amoxicillin (Amoxil) 500 mg capsule 2000 mg po one dose prior to procedure, Normal      aspirin 81 mg EC tablet Take 1 tablet (81 mg) by mouth once daily., Historical Med      cyanocobalamin (Vitamin B-12) 50 mcg tablet Take 1 tablet (50 mcg) by mouth once daily., Historical Med      cyclobenzaprine (Flexeril) 10 mg tablet Take 1 tablet (10 mg) by mouth 3 times a day as needed for muscle spasms., Starting Fri 4/5/2024, Until Tue 6/4/2024 at 2359, Normal      ezetimibe (Zetia) 10 mg tablet Take 1 tablet (10 mg) by mouth once daily., Starting Fri 4/26/2024, Until Wed 10/23/2024, Normal      lisinopril 10 mg tablet Take 1 tablet (10 mg) by mouth once daily., Starting Tue 11/14/2023, Normal      omeprazole (PriLOSEC) 20 mg DR capsule Take 1 capsule (20 mg) by mouth once daily., Starting Thu 7/20/2023, Normal      PARoxetine (Paxil) 40 mg tablet Take 1 tablet (40 mg) by mouth once daily., Starting Fri 4/5/2024, Normal      predniSONE (Deltasone) 10 mg tablet Take 1.5 tablets (15 mg) by mouth once daily., Starting Fri 5/17/2024, Until Wed 11/13/2024, Normal      pyridoxine (Vitamin B-6) 50 mg tablet Take 1 tablet (50 mg) by mouth once daily., Historical Med      sodium,potassium,mag sulfates (Suprep) 17.5-3.13-1.6 gram recon soln solution Take one bottle twice as directed by the prep instructions, Normal           SURGICAL HISTORY       Past Surgical History:   Procedure Laterality Date    OTHER SURGICAL HISTORY  11/06/2019    Tonsillectomy    OTHER SURGICAL HISTORY  04/07/2021    Complete colonoscopy    OTHER SURGICAL HISTORY  04/05/2021    Patent foramen ovale repair     ALLERGIES     Cat hair standardized allergenic extract, Mold, Latex, and Other  FAMILY HISTORY       Family History   Problem Relation Name Age of Onset    Diabetes Mother       SOCIAL HISTORY       Social History     Tobacco Use    Smoking status: Never     Smokeless tobacco: Never   Substance Use Topics    Alcohol use: Yes    Drug use: Not Currently     PHYSICAL EXAM  (up to 7 for level 4, 8 or more for level 5)     ED Triage Vitals [05/28/24 2007]   Temperature Heart Rate Respirations BP   36.6 °C (97.9 °F) 92 18 146/90      Pulse Ox Temp Source Heart Rate Source Patient Position   97 % Temporal Monitor Lying      BP Location FiO2 (%)     Right arm --       Physical Exam  Constitutional:       General: She is not in acute distress.     Appearance: She is not ill-appearing, toxic-appearing or diaphoretic.   HENT:      Head: Normocephalic and atraumatic.      Mouth/Throat:      Mouth: Mucous membranes are moist.      Pharynx: Oropharynx is clear.   Eyes:      Extraocular Movements: Extraocular movements intact.      Pupils: Pupils are equal, round, and reactive to light.   Cardiovascular:      Rate and Rhythm: Normal rate and regular rhythm.      Pulses:           Radial pulses are 2+ on the right side and 2+ on the left side.        Dorsalis pedis pulses are 2+ on the right side and 2+ on the left side.   Pulmonary:      Effort: Pulmonary effort is normal. No respiratory distress.      Breath sounds: No stridor. No wheezing, rhonchi or rales.   Chest:      Chest wall: No tenderness.   Abdominal:      General: Abdomen is flat.      Palpations: Abdomen is soft.      Tenderness: There is no abdominal tenderness. There is no guarding or rebound.   Musculoskeletal:      Right shoulder: No deformity or tenderness. Normal range of motion.      Left shoulder: No deformity or tenderness. Normal range of motion.      Right upper arm: No deformity or tenderness.      Left upper arm: No deformity or tenderness.      Right elbow: No deformity. Normal range of motion. No tenderness.      Left elbow: No deformity. Normal range of motion. No tenderness.      Right forearm: Tenderness present. No deformity.      Left forearm: No deformity or tenderness.      Right wrist: No  deformity, tenderness or snuff box tenderness. Normal range of motion.      Left wrist: No deformity, tenderness or snuff box tenderness. Normal range of motion.      Right hand: Tenderness present. Normal range of motion.      Left hand: No tenderness. Normal range of motion.      Cervical back: Neck supple. No deformity or tenderness.      Thoracic back: No deformity or tenderness.      Lumbar back: No deformity or tenderness.      Right hip: No deformity or tenderness. Normal range of motion.      Left hip: No deformity or tenderness. Normal range of motion.      Right upper leg: No deformity or tenderness.      Left upper leg: No deformity or tenderness.      Right knee: No deformity. Normal range of motion. No tenderness.      Left knee: No deformity. Normal range of motion. No tenderness.      Right lower leg: Tenderness present. No deformity.      Left lower leg: No deformity or tenderness.      Right ankle: No deformity. No tenderness.      Left ankle: No deformity. No tenderness.      Right foot: Normal range of motion. Tenderness present. No deformity.      Left foot: Normal range of motion. No deformity or tenderness.   Skin:     General: Skin is warm and dry.      Capillary Refill: Capillary refill takes less than 2 seconds.      Findings: Lesion (Right knee abrasion) present. No bruising.   Neurological:      General: No focal deficit present.      Cranial Nerves: No cranial nerve deficit.      Sensory: No sensory deficit.      Motor: No weakness.      Coordination: Coordination normal.        DIAGNOSTIC RESULTS   LABS:  Labs Reviewed - No data to display  All other labs were within normal range or not returned as of this dictation.  Imaging  XR tibia fibula right 2 views   Final Result   No acute fracture or dislocation..   Signed by Vance Gasca MD      XR foot right 3+ views   Final Result   No acute fracture..   Signed by Vance Gasca MD      XR forearm right 2 views   Final Result   No cortical  "discontinuity within the radial head which could represent   a radial head fracture..   Signed by Vance Gasca MD      XR hand right 3+ views   Final Result   No acute fracture or dislocation..   Signed by Vance Gasca MD         Procedure  Procedures  EMERGENCY DEPARTMENT COURSE/MDM:   Medical Decision Making    Vitals:    Vitals:    05/28/24 2007 05/28/24 2157 05/28/24 2254   BP: 146/90 145/88 159/89   BP Location: Right arm Left arm    Patient Position: Lying Lying Sitting   Pulse: 92 80 80   Resp: 18 20 18   Temp: 36.6 °C (97.9 °F)     TempSrc: Temporal     SpO2: 97% 96% 98%   Weight: 90.7 kg (200 lb)     Height: 1.753 m (5' 9\")       Rosie Huynh is a female 68 y.o. who presents to the ER for simple mechanical ground-level fall. On arrival the patients vital signs were: Afebrile, Reguar HR, Normotensive, Regular RR, and Oxygenating well on room air. History obtained from: patient.  X-rays obtained of sites of tenderness.  Plan for Boostrix updated.  ED Course as of 05/29/24 0252   Tue May 28, 2024   2142 XR tibia fibula right 2 views  IMPRESSION:  No acute fracture or dislocation..   [CB]   2154 XR hand right 3+ views  IMPRESSION:  No acute fracture or dislocation..   [CB]   2154 XR forearm right 2 views  IMPRESSION:  No cortical discontinuity within the radial head which could represent  a radial head fracture..   [CB]   2154 XR foot right 3+ views  IMPRESSION:  No acute fracture..   [CB]      ED Course User Index  [CB] Vernon Jurado DO         Diagnoses as of 05/29/24 0252   Fall, initial encounter     External Records Reviewed: I reviewed recent and relevant outside records including inpatient notes, outpatient records      Shared decision making for disposition  Patient and/or patient´s representative was counseled regarding labs, imaging, likely diagnosis. All questions were answered. Recommendation was made   for discharge home. The patient agreed and was discharged home in stable condition with " appropriate relevant educational materials. Return precautions were provided which included worsening headache, nausea, vomiting, fever of 38C (100.4) or higher, confusion, difficulty concentrating change in personality, difficulty waking from sleep, neck pain, fainting, seizure, or any new or worsening symptoms..     ED Medications administered this visit:    Medications   diphth,pertus(acell),tetanus (BoostRIX) 2.5-8-5 Lf-mcg-Lf/0.5mL vaccine 0.5 mL (0.5 mL intramuscular Given 5/28/24 1066)          Final Impression:   1. Fall, initial encounter          Please excuse any misspellings or unintended errors related to the Dragon speech recognition software used to dictate this note.    I reviewed the case with the attending ED physician. The attending ED physician agrees with the plan.      Vernon Jurado,   Resident  05/29/24 5319

## 2024-06-04 DIAGNOSIS — Z12.11 COLON CANCER SCREENING: ICD-10-CM

## 2024-06-04 DIAGNOSIS — R19.8 CHANGE IN BOWEL MOVEMENT: ICD-10-CM

## 2024-06-10 ENCOUNTER — LAB (OUTPATIENT)
Dept: LAB | Facility: HOSPITAL | Age: 69
End: 2024-06-10
Payer: COMMERCIAL

## 2024-06-10 ENCOUNTER — OFFICE VISIT (OUTPATIENT)
Dept: RHEUMATOLOGY | Facility: CLINIC | Age: 69
End: 2024-06-10
Payer: COMMERCIAL

## 2024-06-10 VITALS
HEIGHT: 69 IN | BODY MASS INDEX: 32.73 KG/M2 | TEMPERATURE: 98.6 F | HEART RATE: 85 BPM | SYSTOLIC BLOOD PRESSURE: 136 MMHG | WEIGHT: 221 LBS | DIASTOLIC BLOOD PRESSURE: 88 MMHG

## 2024-06-10 DIAGNOSIS — M54.50 CHRONIC LOW BACK PAIN, UNSPECIFIED BACK PAIN LATERALITY, UNSPECIFIED WHETHER SCIATICA PRESENT: ICD-10-CM

## 2024-06-10 DIAGNOSIS — R00.0 SINUS TACHYCARDIA: ICD-10-CM

## 2024-06-10 DIAGNOSIS — M35.3 POLYMYALGIA RHEUMATICA (MULTI): ICD-10-CM

## 2024-06-10 DIAGNOSIS — G43.909 MIGRAINE WITHOUT STATUS MIGRAINOSUS, NOT INTRACTABLE, UNSPECIFIED MIGRAINE TYPE: ICD-10-CM

## 2024-06-10 DIAGNOSIS — R42 LIGHT HEADEDNESS: ICD-10-CM

## 2024-06-10 DIAGNOSIS — M25.551 BILATERAL HIP PAIN: ICD-10-CM

## 2024-06-10 DIAGNOSIS — E03.9 ACQUIRED HYPOTHYROIDISM: ICD-10-CM

## 2024-06-10 DIAGNOSIS — J45.909 MILD ASTHMA, UNSPECIFIED WHETHER COMPLICATED, UNSPECIFIED WHETHER PERSISTENT (HHS-HCC): ICD-10-CM

## 2024-06-10 DIAGNOSIS — R41.3 MEMORY DEFICIT: ICD-10-CM

## 2024-06-10 DIAGNOSIS — F41.9 ANXIETY: ICD-10-CM

## 2024-06-10 DIAGNOSIS — R09.89 BILATERAL CAROTID BRUITS: ICD-10-CM

## 2024-06-10 DIAGNOSIS — I67.89 ACUTE, BUT ILL-DEFINED, CEREBROVASCULAR DISEASE: ICD-10-CM

## 2024-06-10 DIAGNOSIS — R61 DIAPHORESIS: ICD-10-CM

## 2024-06-10 DIAGNOSIS — M79.10 MYALGIA: ICD-10-CM

## 2024-06-10 DIAGNOSIS — I10 HYPERTENSION, ESSENTIAL, BENIGN: ICD-10-CM

## 2024-06-10 DIAGNOSIS — G89.29 CHRONIC LOW BACK PAIN, UNSPECIFIED BACK PAIN LATERALITY, UNSPECIFIED WHETHER SCIATICA PRESENT: ICD-10-CM

## 2024-06-10 DIAGNOSIS — Z86.73 HISTORY OF CVA (CEREBROVASCULAR ACCIDENT): ICD-10-CM

## 2024-06-10 DIAGNOSIS — Q21.12 PFO (PATENT FORAMEN OVALE) (HHS-HCC): ICD-10-CM

## 2024-06-10 DIAGNOSIS — R00.2 PALPITATIONS: ICD-10-CM

## 2024-06-10 DIAGNOSIS — M25.512 ACUTE PAIN OF BOTH SHOULDERS: ICD-10-CM

## 2024-06-10 DIAGNOSIS — R06.09 DOE (DYSPNEA ON EXERTION): ICD-10-CM

## 2024-06-10 DIAGNOSIS — R79.82 ELEVATED C-REACTIVE PROTEIN (CRP): ICD-10-CM

## 2024-06-10 DIAGNOSIS — M25.511 ACUTE PAIN OF BOTH SHOULDERS: ICD-10-CM

## 2024-06-10 DIAGNOSIS — M54.2 NECK PAIN: ICD-10-CM

## 2024-06-10 DIAGNOSIS — M25.552 BILATERAL HIP PAIN: ICD-10-CM

## 2024-06-10 DIAGNOSIS — K21.9 GASTROESOPHAGEAL REFLUX DISEASE WITHOUT ESOPHAGITIS: ICD-10-CM

## 2024-06-10 LAB
CHOLEST SERPL-MCNC: 292 MG/DL (ref 0–199)
CHOLESTEROL/HDL RATIO: 5.2
CRP SERPL-MCNC: 0.15 MG/DL
ERYTHROCYTE [SEDIMENTATION RATE] IN BLOOD BY WESTERGREN METHOD: 2 MM/H (ref 0–30)
HDLC SERPL-MCNC: 56.4 MG/DL
LDLC SERPL CALC-MCNC: 170 MG/DL
NON HDL CHOLESTEROL: 236 MG/DL (ref 0–149)
RHEUMATOID FACT SER NEPH-ACNC: 12 IU/ML (ref 0–15)
TRIGL SERPL-MCNC: 326 MG/DL (ref 0–149)
VLDL: 65 MG/DL (ref 0–40)

## 2024-06-10 PROCEDURE — 36415 COLL VENOUS BLD VENIPUNCTURE: CPT

## 2024-06-10 PROCEDURE — 1036F TOBACCO NON-USER: CPT | Performed by: STUDENT IN AN ORGANIZED HEALTH CARE EDUCATION/TRAINING PROGRAM

## 2024-06-10 PROCEDURE — 1159F MED LIST DOCD IN RCRD: CPT | Performed by: STUDENT IN AN ORGANIZED HEALTH CARE EDUCATION/TRAINING PROGRAM

## 2024-06-10 PROCEDURE — 86140 C-REACTIVE PROTEIN: CPT

## 2024-06-10 PROCEDURE — 82607 VITAMIN B-12: CPT

## 2024-06-10 PROCEDURE — 3079F DIAST BP 80-89 MM HG: CPT | Performed by: STUDENT IN AN ORGANIZED HEALTH CARE EDUCATION/TRAINING PROGRAM

## 2024-06-10 PROCEDURE — 99204 OFFICE O/P NEW MOD 45 MIN: CPT | Performed by: STUDENT IN AN ORGANIZED HEALTH CARE EDUCATION/TRAINING PROGRAM

## 2024-06-10 PROCEDURE — 86431 RHEUMATOID FACTOR QUANT: CPT

## 2024-06-10 PROCEDURE — 80061 LIPID PANEL: CPT

## 2024-06-10 PROCEDURE — 85652 RBC SED RATE AUTOMATED: CPT

## 2024-06-10 PROCEDURE — 3075F SYST BP GE 130 - 139MM HG: CPT | Performed by: STUDENT IN AN ORGANIZED HEALTH CARE EDUCATION/TRAINING PROGRAM

## 2024-06-10 RX ORDER — PREDNISONE 5 MG/1
TABLET ORAL DAILY
Qty: 165 TABLET | Refills: 0 | Status: SHIPPED | OUTPATIENT
Start: 2024-06-10 | End: 2024-06-10

## 2024-06-10 RX ORDER — PREDNISONE 5 MG/1
TABLET ORAL DAILY
Qty: 195 TABLET | Refills: 0 | Status: SHIPPED | OUTPATIENT
Start: 2024-06-10 | End: 2024-08-24

## 2024-06-10 NOTE — PROGRESS NOTES
Subjective   Patient ID: Rosie Huynh is a 68 y.o. female who presents for New Patient Visit.  HPI    The patient is a 68 year old female here for evaluation for possible PMR.    PMH: History of PFO s/p repair with [place] 2010, prior stroke, HLD, migraines      She notes pain in her neck which started in March 2024, was unable to lift arms. Started having pain in her hips and thighs. This was very sudden in nature, started abruptly. Denies any joint swelling pain or pain, has more generalized pain in stiffness in shoulders, neck and upper arms. No visual symptoms, vision loss, headaches (does have chronic migraines), jaw pain, scalp tenderness. She had a remarkable improvement in symptoms just hours after taking Prednisone, Symptoms almost completely resolved.     - Regarding Prednisone took 15 mg daily for a month, and then 20 mg daily for a month. She is currently taking Prednisone 20 mg daily.     No rashes, raynauds, oral ulcers, SOB, chest pain, GI/  symptoms     Labs reviewed:  - ESR 50, CRP 1.84  - CBC with normal WBC, Hgb, PLT, but has had an elevation in PLT and drop in Hgb as compared to her baseline  - CMP stable    Imaging:  Right hand X-ray:  There is no displaced fracture.  The alignment is anatomic.  There is  a joint space narrowing noted involving the DIP joints.  There is  evidence of bony demineralization.  No soft tissue abnormality is  seen.    Family history:  - No known autoimmune history    Social history:  - Does not smoke, occasional alcohol use, worked as a wallpaper      Review of Systems  Constitutional: Denies fever, chills   Eyes: Denies dry eyes, blurry vision, redness of the eyes, pain in the eyes   ENT: Denies dry mouth, loss of taste, sores in the mouth, or difficulty swallowing   Cardiovascular: Denies chest pain, palpitations   Respiratory: Denies shortness of breath   Gastrointestinal: Denies changes in bowl or bladder function, nausea, vomiting, heartburn    Integumentary: Denies photosensitivity, rash or lesions, Raynaud's   Neurological: Denies any numbness or tingling   Hematologic/Lymphatic: Denies bleeding, alopecia, Raynaud's phenomena   MSK: As per HPI. Denies weakness, difficulty rising from chair, aches, problems with hand        Objective   Physical Exam  General: AAOx3. Cooperative.   Head: normocephalic, atraumatic   Eyes: EOMI, conjunctiva clear, sclera white, anicteric   Ears: hearing intact   Nose: no deformity   Throat/Mouth: No oral deformities. Mucosa appear moist. No oral ulcers noted.   Neck/Lymph: FROM. trachea midline   Lungs: chest expansion symmetric Clear to auscultation bilaterally. No wheezing, rhonchi, stridor.   Heart: S1, S2, RRR. No murmur, rub.   Abdomen: Soft, non-tender without masses   Neuro: CN II-XII grossly intact, no focal deficit   Skin: No rashes, ulcers or photosensitive areas   MSK: Upper Extremities: Strength 5/5  Hand/Fingers: No redness, swelling, pain at DIP, PIP, or MCP joints, FROM grossly.   Wrists: no erythema, swelling, or pain at wrist, FROM grossly   Elbows: No swelling, pain, erythema on elbows, FROM grossly   Shoulders: no swelling, redness, tenderness at shoulders   Lower Extremities: Strength 5/5  Hips: No obvious deformities. no joint tenderness, normal ROM grossly   Knees: No pain, deformities, swelling, rashes, warmth, normal ROM grossly   Ankles, feet: no deformities, swelling, ulceration, warmth, swelling, synovitis at ankle joints, normal ROM grossly.    Assessment/Plan   Diagnoses and all orders for this visit:  Bilateral hip pain  -     Referral to Rheumatology  Acute pain of both shoulders  -     Referral to Rheumatology  Myalgia  -     Referral to Rheumatology  Elevated C-reactive protein (CRP)  -     Referral to Rheumatology  Polymyalgia rheumatica (Multi)  -     Referral to Rheumatology  -     Sedimentation Rate; Future  -     C-Reactive Protein; Future  -     Rheumatoid Factor; Future  -      predniSONE (Deltasone) 5 mg tablet; Take 3.5 tablets (17.5 mg) by mouth once daily for 15 days, THEN 3 tablets (15 mg) once daily for 15 days, THEN 2.5 tablets (12.5 mg) once daily for 15 days, THEN 2 tablets (10 mg) once daily.       Ms. Huynh is a 68 year old male with a PMH of PFO s/p repair with [place] 2010, prior stroke, HLD, migraines presenting for evaluation of recently diagnosed PMR. Her symptoms began around 3 months ago, stiffness and weakness in neck, shoulders, hips. Labs with elevated ESR, CRP, had a remarkable improvement in symptoms just hours after taking Prednisone, Symptoms almost completely resolved. Regarding Prednisone took 15 mg daily for a month, and then 20 mg daily for a month. She is currently taking Prednisone 20 mg daily. No visual symptoms, vision loss, headaches (does have chronic migraines), jaw pain, scalp tenderness.     Labs reviewed:  - ESR 50, CRP 1.84  - CBC with normal WBC, Hgb, PLT, but has had an elevation in PLT and drop in Hgb as compared to her baseline  - CMP stable    PLAN:  - Continue PMR taper as outlined above:  Take 3.5 tablets (17.5 mg) by mouth once daily for 15 days, THEN 3 tablets (15 mg) once daily for 15 days, THEN 2.5 tablets (12.5 mg) once daily for 15 days, THEN 2 tablets (10 mg) once daily.  - Repeat ESR, CRP, RF today  - Follow up in 2-3 months  - Will obtain DEXA at follow up    Case discussed with Dr. Rene Kim DO 06/10/24 11:16 AM

## 2024-06-11 ENCOUNTER — TELEPHONE (OUTPATIENT)
Dept: RHEUMATOLOGY | Facility: CLINIC | Age: 69
End: 2024-06-11
Payer: COMMERCIAL

## 2024-06-11 NOTE — TELEPHONE ENCOUNTER
Patient states that she saw Dr. Kim yesterday and she has questions about her Prednisone directions. Does she take the Prednisone in half/split the pills? Half the pill in the AM? And half the pill in the PM? Please call her at [phone].

## 2024-06-12 ENCOUNTER — APPOINTMENT (OUTPATIENT)
Dept: NEUROLOGY | Facility: CLINIC | Age: 69
End: 2024-06-12
Payer: COMMERCIAL

## 2024-06-12 VITALS
DIASTOLIC BLOOD PRESSURE: 90 MMHG | HEART RATE: 91 BPM | HEIGHT: 69 IN | SYSTOLIC BLOOD PRESSURE: 142 MMHG | WEIGHT: 219.1 LBS | BODY MASS INDEX: 32.45 KG/M2 | TEMPERATURE: 96.8 F

## 2024-06-12 DIAGNOSIS — I63.9 CEREBELLAR INFARCTION (MULTI): ICD-10-CM

## 2024-06-12 DIAGNOSIS — Z86.73 HISTORY OF CVA (CEREBROVASCULAR ACCIDENT): ICD-10-CM

## 2024-06-12 DIAGNOSIS — R41.3 MEMORY DEFICIT: ICD-10-CM

## 2024-06-12 LAB — VIT B12 SERPL-MCNC: 244 PG/ML (ref 211–911)

## 2024-06-12 PROCEDURE — 3077F SYST BP >= 140 MM HG: CPT | Performed by: PSYCHIATRY & NEUROLOGY

## 2024-06-12 PROCEDURE — 99204 OFFICE O/P NEW MOD 45 MIN: CPT | Performed by: PSYCHIATRY & NEUROLOGY

## 2024-06-12 PROCEDURE — 3080F DIAST BP >= 90 MM HG: CPT | Performed by: PSYCHIATRY & NEUROLOGY

## 2024-06-12 PROCEDURE — 1159F MED LIST DOCD IN RCRD: CPT | Performed by: PSYCHIATRY & NEUROLOGY

## 2024-06-12 PROCEDURE — 1036F TOBACCO NON-USER: CPT | Performed by: PSYCHIATRY & NEUROLOGY

## 2024-06-12 ASSESSMENT — PATIENT HEALTH QUESTIONNAIRE - PHQ9
2. FEELING DOWN, DEPRESSED OR HOPELESS: NEARLY EVERY DAY
SUM OF ALL RESPONSES TO PHQ QUESTIONS 1-9: 17
SUM OF ALL RESPONSES TO PHQ9 QUESTIONS 1 & 2: 5
10. IF YOU CHECKED OFF ANY PROBLEMS, HOW DIFFICULT HAVE THESE PROBLEMS MADE IT FOR YOU TO DO YOUR WORK, TAKE CARE OF THINGS AT HOME, OR GET ALONG WITH OTHER PEOPLE: VERY DIFFICULT
9. THOUGHTS THAT YOU WOULD BE BETTER OFF DEAD, OR OF HURTING YOURSELF: NOT AT ALL
4. FEELING TIRED OR HAVING LITTLE ENERGY: SEVERAL DAYS
7. TROUBLE CONCENTRATING ON THINGS, SUCH AS READING THE NEWSPAPER OR WATCHING TELEVISION: MORE THAN HALF THE DAYS
6. FEELING BAD ABOUT YOURSELF - OR THAT YOU ARE A FAILURE OR HAVE LET YOURSELF OR YOUR FAMILY DOWN: NEARLY EVERY DAY
8. MOVING OR SPEAKING SO SLOWLY THAT OTHER PEOPLE COULD HAVE NOTICED. OR THE OPPOSITE, BEING SO FIGETY OR RESTLESS THAT YOU HAVE BEEN MOVING AROUND A LOT MORE THAN USUAL: NOT AT ALL
3. TROUBLE FALLING OR STAYING ASLEEP: NEARLY EVERY DAY
1. LITTLE INTEREST OR PLEASURE IN DOING THINGS: MORE THAN HALF THE DAYS
5. POOR APPETITE OR OVEREATING: NEARLY EVERY DAY

## 2024-06-12 ASSESSMENT — LIFESTYLE VARIABLES
HOW OFTEN DO YOU HAVE A DRINK CONTAINING ALCOHOL: MONTHLY OR LESS
AUDIT-C TOTAL SCORE: 1
HOW MANY STANDARD DRINKS CONTAINING ALCOHOL DO YOU HAVE ON A TYPICAL DAY: 1 OR 2
SKIP TO QUESTIONS 9-10: 1
HOW OFTEN DO YOU HAVE SIX OR MORE DRINKS ON ONE OCCASION: NEVER

## 2024-06-12 NOTE — PROGRESS NOTES
Consulting Physician: Dr. Olmos    Chief Complaint: Memory Difficulty    History Of Present Illness  Rosie Huynh is a 68 y.o. female presenting with memory difficulty.    The patient noted memory difficulty 4-5 years ago.  She would have moments where she would place things in the wrong spot (I.e. putting her bra in the cabinet).  She is now forgetting names of familiar faces.  She is having moments of word finding difficulty.  She frequently misplaces her keys.  She does forget doctor's appointments.  Her daughter tells her that she does repeat herself.  She denies any difficulty with learning new things (I.e. she recently learned how to croquet).  She does not drive.  During the day, the patient cooks, manages her apartment, and takes care of her dog.  She does admit to losing interest in things.  She admits to depression.  She denies SI/HI.  She manages her medications on her own without difficulty.      She has a history of stroke 14 years ago.  Her deficit is loss of fine motor skills in her right hand and occasional right leg weakness.  She was found to have a PFO and underwent closure following the stroke.       Past Medical History  Past Medical History:   Diagnosis Date    Abnormal computed tomography of pelvis 06/09/2023    Actinic keratoses 06/09/2023    Bleeding hemorrhoid 06/09/2023    Bowel incontinence 06/09/2023    Cerebral infarction due to thrombosis of other cerebral artery (Multi)     Cerebrovascular accident (CVA) due to thrombosis of other cerebral artery    Chronic constipation 06/09/2023    Closed fracture of left distal fibula 07/31/2018    Closed nondisplaced longitudinal fracture of right patella 07/30/2023    Fibroids 06/09/2023    History of basal cell cancer 06/09/2023    Hyperlipidemia 07/08/2010    Migraine with aura and without status migrainosus, not intractable 09/18/2015    Patent foramen ovale ([facility])     PFO (patent foramen ovale)    Personal history of other malignant  neoplasm of skin     History of other malignant neoplasm of skin       Surgical History  Past Surgical History:   Procedure Laterality Date    OTHER SURGICAL HISTORY  11/06/2019    Tonsillectomy    OTHER SURGICAL HISTORY  04/07/2021    Complete colonoscopy    OTHER SURGICAL HISTORY  04/05/2021    Patent foramen ovale repair       Family History  Family History   Problem Relation Name Age of Onset    Diabetes Mother          Social History   reports that she has never smoked. She has never used smokeless tobacco. She reports current alcohol use. She reports that she does not currently use drugs.     Allergies  Cat hair standardized allergenic extract, Mold, Latex, and Other    Medications    Current Outpatient Medications:     albuterol 90 mcg/actuation inhaler, Inhale., Disp: , Rfl:     amoxicillin (Amoxil) 500 mg capsule, 2000 mg po one dose prior to procedure, Disp: 4 capsule, Rfl: 1    aspirin 81 mg EC tablet, Take 1 tablet (81 mg) by mouth once daily., Disp: , Rfl:     cyanocobalamin (Vitamin B-12) 50 mcg tablet, Take 1 tablet (50 mcg) by mouth once daily., Disp: , Rfl:     ezetimibe (Zetia) 10 mg tablet, Take 1 tablet (10 mg) by mouth once daily., Disp: 30 tablet, Rfl: 5    lisinopril 10 mg tablet, Take 1 tablet (10 mg) by mouth once daily., Disp: 90 tablet, Rfl: 1    omeprazole (PriLOSEC) 20 mg DR capsule, Take 1 capsule (20 mg) by mouth once daily., Disp: 90 capsule, Rfl: 3    PARoxetine (Paxil) 40 mg tablet, Take 1 tablet (40 mg) by mouth once daily., Disp: 90 tablet, Rfl: 1    predniSONE (Deltasone) 5 mg tablet, Take 3.5 tablets (17.5 mg) by mouth once daily for 15 days, THEN 3 tablets (15 mg) once daily for 15 days, THEN 2.5 tablets (12.5 mg) once daily for 15 days, THEN 2 tablets (10 mg) once daily., Disp: 195 tablet, Rfl: 0    pyridoxine (Vitamin B-6) 50 mg tablet, Take 1 tablet (50 mg) by mouth once daily., Disp: , Rfl:     cyclobenzaprine (Flexeril) 10 mg tablet, Take 1 tablet (10 mg) by mouth 3 times  "a day as needed for muscle spasms. (Patient not taking: Reported on 6/10/2024), Disp: 30 tablet, Rfl: 1      Last Recorded Vitals   There were no vitals taken for this visit.    Objective:  /90 (BP Location: Left arm, Patient Position: Sitting, BP Cuff Size: Adult)   Pulse 91   Temp 36 °C (96.8 °F) (Temporal)   Ht 1.753 m (5' 9\")   Wt 99.4 kg (219 lb 1.6 oz)   LMP  (LMP Unknown)   BMI 32.36 kg/m²     Gen: NAD  Neuro:  --HIF:   Mini-Mental Status Exam:  Orientation to Time (Year, Season, Month, Date, Day of Week): 5  Orientation to Place (State, County, City, Name of Place, Floor):  5  Registration (Apple, Table, Samantha): 3  Attention (Spell 'World' backwards): 5  Delayed Verbal Recall: 3  Naming (Watch, Pen): 2  Repetition (No Ifs, Ands, or Buts): 1  Follows command with crossover: 3  Read a sentence: 1  Write a sentence: 1  Copy a figure: 1  Total Score: 30    --CN:  PERRLA, EOMI, VFF, no visible facial asymmetry, facial sensation intact, no tongue or palatal deviation, SCM intact  --Motor: Moves all 4 extremities equally; no focal deficits except the following:     Difficulty with right fingertapping  --Sensory: Intact to light touch, intact to pinprick  --Reflex: 2+ symmetric, toes down  --Cerebellum: FTN and HTS intact except the following: mild right limb ataxia noted on finger to nose  --Gait: Mildly wide based gait    Relevant Results  Lab Results   Component Value Date    WBC 9.6 04/11/2024    HGB 12.6 04/11/2024    HCT 39.6 04/11/2024    MCV 92 04/11/2024     04/11/2024       Lab Results   Component Value Date    GLUCOSE 98 04/11/2024    CALCIUM 9.2 04/11/2024     04/11/2024    K 4.8 04/11/2024    CO2 27 04/11/2024     04/11/2024    BUN 17 04/11/2024    CREATININE 0.86 04/11/2024       Lab Results   Component Value Date    HGBA1C 5.2 08/24/2023       Lab Results   Component Value Date    CHOL 292 (H) 06/10/2024    CHOL 159 08/24/2023    CHOL 175 07/14/2023     Lab Results " "  Component Value Date    HDL 56.4 06/10/2024    HDL 37.1 (A) 08/24/2023    HDL 43.6 07/14/2023     Lab Results   Component Value Date    LDLCALC 170 (H) 06/10/2024     Lab Results   Component Value Date    TRIG 326 (H) 06/10/2024    TRIG 158 (H) 08/24/2023    TRIG 188 (H) 07/14/2023     No components found for: \"CHOLHDL\"       Assessment:  This is a 68 year old female presenting for evaluation of memory changes.  She has a history of cerebellar stroke 14 years ago.  Over the last few years, she has noted short-term memory difficulty.  She remains independent.  On exam, her MMSE was 30/30.  Ddx includes MCI (vascular in etiology); other considerations include pseudodementia due to depression.    - MRI Brain  - check B12 level  - discuss with PCP about managing depression  - encouraged patient to participate in mentally stimulating activities (i.e. crossword puzzles, sudoku puzzles, etc)  - encouraged physical exercise (which has been shown to be beneficial for memory health)  - recommend mediterranean diet          Burke Tom MD  [facility]  Department of Neurology      A copy of this note was sent to the referring provider.    "

## 2024-06-14 RX ORDER — SODIUM, POTASSIUM,MAG SULFATES 17.5-3.13G
SOLUTION, RECONSTITUTED, ORAL ORAL
Qty: 1 EACH | Refills: 0 | Status: SHIPPED | OUTPATIENT
Start: 2024-06-14

## 2024-06-21 ENCOUNTER — TELEPHONE (OUTPATIENT)
Dept: PRIMARY CARE | Facility: CLINIC | Age: 69
End: 2024-06-21
Payer: COMMERCIAL

## 2024-06-21 DIAGNOSIS — F41.9 ANXIETY: Primary | ICD-10-CM

## 2024-06-21 RX ORDER — CLONAZEPAM 0.5 MG/1
0.5 TABLET ORAL 2 TIMES DAILY PRN
Qty: 20 TABLET | Refills: 0 | Status: SHIPPED | OUTPATIENT
Start: 2024-06-21 | End: 2024-12-18

## 2024-06-21 NOTE — TELEPHONE ENCOUNTER
Patient called RX line for klonopin refill.   She said her anxiety has returned.   Last natan. 4/26/24

## 2024-06-21 NOTE — TELEPHONE ENCOUNTER
OARRS reviewed, no Rxs in past 24 months.  Pt uses very rarely ever since her stroke.  Rx sent in separate phone encounter.  Katherine Olmos MD

## 2024-06-21 NOTE — TELEPHONE ENCOUNTER
Pt left a vm asking for Klonopin to be sent to her [facility] in [place] on file. I called back bc it shows it was last given 2019 and now discontinued. Pt said that's correct and at last appt PCP was made aware of her anxiety coming back heavily. Pt has an upcoming MRI and is very claustrophobic and needs the Klonopin to help with her MRI.

## 2024-06-25 NOTE — TELEPHONE ENCOUNTER
Return call placed to pt in an attempt to gain more information about pain experienced. Unable to establish direct contact with patient and voicemail left requesting detailed message be left.

## 2024-06-26 NOTE — TELEPHONE ENCOUNTER
- Called patient, discussed in detail. Has some pain in bilateral legs, but no weakness. Able to do daily activities without trouble. No stiffness. Inflammatory markers normalized. She is about to start 15 mg daily, will assess response to that and at follow up.

## 2024-07-06 ENCOUNTER — PATIENT MESSAGE (OUTPATIENT)
Dept: PRIMARY CARE | Facility: CLINIC | Age: 69
End: 2024-07-06
Payer: COMMERCIAL

## 2024-07-06 ENCOUNTER — HOSPITAL ENCOUNTER (OUTPATIENT)
Dept: RADIOLOGY | Facility: HOSPITAL | Age: 69
Discharge: HOME | End: 2024-07-06
Payer: COMMERCIAL

## 2024-07-06 DIAGNOSIS — Z86.73 HISTORY OF CVA (CEREBROVASCULAR ACCIDENT): ICD-10-CM

## 2024-07-06 DIAGNOSIS — R41.3 MEMORY DEFICIT: ICD-10-CM

## 2024-07-16 ENCOUNTER — APPOINTMENT (OUTPATIENT)
Dept: GASTROENTEROLOGY | Facility: HOSPITAL | Age: 69
End: 2024-07-16
Payer: COMMERCIAL

## 2024-07-16 DIAGNOSIS — Z12.11 SCREENING FOR COLON CANCER: ICD-10-CM

## 2024-07-24 ENCOUNTER — TELEPHONE (OUTPATIENT)
Dept: PRIMARY CARE | Facility: CLINIC | Age: 69
End: 2024-07-24
Payer: COMMERCIAL

## 2024-07-24 NOTE — TELEPHONE ENCOUNTER
[employer] form received via mail from [employer] for emotional support animal. Form placed in to do folder to be completed.

## 2024-07-30 ENCOUNTER — TELEPHONE (OUTPATIENT)
Dept: RHEUMATOLOGY | Facility: CLINIC | Age: 69
End: 2024-07-30
Payer: COMMERCIAL

## 2024-07-30 NOTE — TELEPHONE ENCOUNTER
Patient would like to adjust the dosage of her prednisone. She is now down to 10mg and she haw been pain since it has been lowered. Please advise.

## 2024-08-02 DIAGNOSIS — K21.9 GASTROESOPHAGEAL REFLUX DISEASE WITHOUT ESOPHAGITIS: ICD-10-CM

## 2024-08-02 DIAGNOSIS — I10 HYPERTENSION, ESSENTIAL, BENIGN: ICD-10-CM

## 2024-08-02 NOTE — TELEPHONE ENCOUNTER
Needs to be sent on 8/6/ please. Patient request. Last visit 4/15/2. Upcoming 9/30. Just these 2 until appointment.

## 2024-08-05 RX ORDER — OMEPRAZOLE 20 MG/1
20 CAPSULE, DELAYED RELEASE ORAL DAILY
Qty: 90 CAPSULE | Refills: 0 | Status: SHIPPED | OUTPATIENT
Start: 2024-08-05

## 2024-08-05 RX ORDER — LISINOPRIL 10 MG/1
10 TABLET ORAL DAILY
Qty: 90 TABLET | Refills: 0 | Status: SHIPPED | OUTPATIENT
Start: 2024-08-05

## 2024-08-05 NOTE — TELEPHONE ENCOUNTER
- Spoke with the patient: Notes worsening pain and stiffness in her shoulders since starting 10 mg Prd daily. Will increase to 12.5 mg daily, will reevaluate at her upcoming appt in a few weeks.

## 2024-08-05 NOTE — TELEPHONE ENCOUNTER
PATIENT CALLED TODAY INDICATING THAT HER PMR PAIN HAS GOTTEN WORSE OVER THE LAST 5 MONTHS. SHE STATES THAT THE MEDICATION IS NOT A HIGH ENOUGH DOSE AT 10MG. SHE WANTS THIS ADJUSTED ASAP. SHE HAS TROUBLE WALKING. PLEASE CALL HER TO DISCUSS INCREASING THE PREDNISONE [phone].

## 2024-08-21 ENCOUNTER — TELEPHONE (OUTPATIENT)
Dept: RHEUMATOLOGY | Facility: CLINIC | Age: 69
End: 2024-08-21
Payer: COMMERCIAL

## 2024-08-21 DIAGNOSIS — M35.3 POLYMYALGIA RHEUMATICA (MULTI): Primary | ICD-10-CM

## 2024-08-21 RX ORDER — PREDNISONE 5 MG/1
10 TABLET ORAL DAILY
Qty: 120 TABLET | Refills: 0 | Status: SHIPPED | OUTPATIENT
Start: 2024-08-21 | End: 2024-10-20

## 2024-08-21 NOTE — TELEPHONE ENCOUNTER
Patient is requesting a refill on Prednisone 5mg. Please send to [facility] on file. She has no pills left.

## 2024-09-09 ENCOUNTER — LAB (OUTPATIENT)
Dept: LAB | Facility: LAB | Age: 69
End: 2024-09-09
Payer: MEDICARE

## 2024-09-09 ENCOUNTER — APPOINTMENT (OUTPATIENT)
Dept: RHEUMATOLOGY | Facility: CLINIC | Age: 69
End: 2024-09-09
Payer: COMMERCIAL

## 2024-09-09 VITALS
HEART RATE: 93 BPM | WEIGHT: 219 LBS | HEIGHT: 69 IN | SYSTOLIC BLOOD PRESSURE: 136 MMHG | BODY MASS INDEX: 32.44 KG/M2 | TEMPERATURE: 98.1 F | DIASTOLIC BLOOD PRESSURE: 88 MMHG

## 2024-09-09 DIAGNOSIS — M81.0 OSTEOPOROSIS: ICD-10-CM

## 2024-09-09 DIAGNOSIS — M35.3 POLYMYALGIA RHEUMATICA (MULTI): Primary | ICD-10-CM

## 2024-09-09 DIAGNOSIS — Z79.899 IMMUNOSUPPRESSION DUE TO DRUG THERAPY (MULTI): ICD-10-CM

## 2024-09-09 DIAGNOSIS — M35.3 POLYMYALGIA RHEUMATICA (MULTI): ICD-10-CM

## 2024-09-09 DIAGNOSIS — D84.821 IMMUNOSUPPRESSION DUE TO DRUG THERAPY (MULTI): ICD-10-CM

## 2024-09-09 LAB
CRP SERPL-MCNC: 0.45 MG/DL
ERYTHROCYTE [SEDIMENTATION RATE] IN BLOOD BY WESTERGREN METHOD: 4 MM/H (ref 0–30)

## 2024-09-09 PROCEDURE — 3075F SYST BP GE 130 - 139MM HG: CPT | Performed by: STUDENT IN AN ORGANIZED HEALTH CARE EDUCATION/TRAINING PROGRAM

## 2024-09-09 PROCEDURE — 1036F TOBACCO NON-USER: CPT | Performed by: STUDENT IN AN ORGANIZED HEALTH CARE EDUCATION/TRAINING PROGRAM

## 2024-09-09 PROCEDURE — 99215 OFFICE O/P EST HI 40 MIN: CPT | Performed by: STUDENT IN AN ORGANIZED HEALTH CARE EDUCATION/TRAINING PROGRAM

## 2024-09-09 PROCEDURE — 3079F DIAST BP 80-89 MM HG: CPT | Performed by: STUDENT IN AN ORGANIZED HEALTH CARE EDUCATION/TRAINING PROGRAM

## 2024-09-09 PROCEDURE — 1125F AMNT PAIN NOTED PAIN PRSNT: CPT | Performed by: STUDENT IN AN ORGANIZED HEALTH CARE EDUCATION/TRAINING PROGRAM

## 2024-09-09 PROCEDURE — 86140 C-REACTIVE PROTEIN: CPT

## 2024-09-09 PROCEDURE — 85652 RBC SED RATE AUTOMATED: CPT

## 2024-09-09 PROCEDURE — 3008F BODY MASS INDEX DOCD: CPT | Performed by: STUDENT IN AN ORGANIZED HEALTH CARE EDUCATION/TRAINING PROGRAM

## 2024-09-09 PROCEDURE — 1159F MED LIST DOCD IN RCRD: CPT | Performed by: STUDENT IN AN ORGANIZED HEALTH CARE EDUCATION/TRAINING PROGRAM

## 2024-09-09 PROCEDURE — 36415 COLL VENOUS BLD VENIPUNCTURE: CPT

## 2024-09-09 RX ORDER — PREDNISONE 1 MG/1
TABLET ORAL
Qty: 1200 TABLET | Refills: 0 | Status: SHIPPED | OUTPATIENT
Start: 2024-09-09 | End: 2025-02-06

## 2024-09-09 RX ORDER — FOLIC ACID 1 MG/1
1 TABLET ORAL DAILY
Qty: 30 TABLET | Refills: 11 | Status: SHIPPED | OUTPATIENT
Start: 2024-09-09 | End: 2025-09-09

## 2024-09-09 RX ORDER — METHOTREXATE 2.5 MG/1
15 TABLET ORAL WEEKLY
Qty: 60 TABLET | Refills: 1 | Status: SHIPPED | OUTPATIENT
Start: 2024-09-09 | End: 2025-01-07

## 2024-09-09 ASSESSMENT — ENCOUNTER SYMPTOMS: LIMITED RANGE OF MOTION: 1

## 2024-09-09 ASSESSMENT — PAIN SCALES - GENERAL: PAINLEVEL: 6

## 2024-09-09 NOTE — PROGRESS NOTES
Subjective   Patient ID: Rosie Huynh is a 68 y.o. female who presents for Follow-up.  HPI    The patient is a 68 year old female here for follow up of PMR.  PMR recently diagnosed in March 2024, has been tapering off Prednisone.    Interval history:  - Today she notes that overall she is doing well, but notes chronic pain in bilateral shoulder. Weakness has resolved. No headaches, jaw pain, vision issues, scalp tenderness. No difficulty with walking, climbing stairs. No fevers, weight changes. No n/v/gi issues. Currently on 12.5 mg Prednisone daily. Had to go back up from 10 mg daily due to bilateral shoulder pain. No significant stiffness in shoulders, pain throughout the day.        Prior recall:   PMH: History of PFO s/p repair with [place] 2010, prior stroke, HLD, migraines       She notes pain in her neck which started in March 2024, was unable to lift arms. Started having pain in her hips and thighs. This was very sudden in nature, started abruptly. Denies any joint swelling pain or pain, has more generalized pain in stiffness in shoulders, neck and upper arms. No visual symptoms, vision loss, headaches (does have chronic migraines), jaw pain, scalp tenderness. She had a remarkable improvement in symptoms just hours after taking Prednisone, Symptoms almost completely resolved.      - Regarding Prednisone took 15 mg daily for a month, and then 20 mg daily for a month. She is currently taking Prednisone 20 mg daily.      No rashes, raynauds, oral ulcers, SOB, chest pain, GI/  symptoms      Labs reviewed:  - ESR 50, CRP 1.84  - CBC with normal WBC, Hgb, PLT, but has had an elevation in PLT and drop in Hgb as compared to her baseline  - CMP stable     Imaging:  Right hand X-ray:  There is no displaced fracture.  The alignment is anatomic.  There is  a joint space narrowing noted involving the DIP joints.  There is  evidence of bony demineralization.  No soft tissue abnormality is  seen.     Family  history:  - No known autoimmune history     Social history:  - Does not smoke, occasional alcohol use, worked as a wallpaper       Review of Systems  Constitutional: Denies fever, chills   Eyes: Denies dry eyes, blurry vision, redness of the eyes, pain in the eyes   ENT: Denies dry mouth, loss of taste, sores in the mouth, or difficulty swallowing   Cardiovascular: Denies chest pain, palpitations   Respiratory: Denies shortness of breath   Gastrointestinal: Denies changes in bowl or bladder function, nausea, vomiting, heartburn   Integumentary: Denies photosensitivity, rash or lesions, Raynaud's   Neurological: Denies any numbness or tingling   Hematologic/Lymphatic: Denies bleeding, alopecia, Raynaud's phenomena   MSK: As per HPI. Denies weakness, difficulty rising from chair, aches, problems with hand      Objective   Physical Exam  General: AAOx3. Cooperative.   Head: normocephalic, atraumatic   Eyes: EOMI, conjunctiva clear, sclera white, anicteric   Ears: hearing intact   Nose: no deformity   Throat/Mouth: No oral deformities. Mucosa appear moist. No oral ulcers noted.   Neck/Lymph: FROM. trachea midline   Lungs: chest expansion symmetric Clear to auscultation bilaterally. No wheezing, rhonchi, stridor.   Heart: S1, S2, RRR. No murmur, rub.   Abdomen: Soft, non-tender without masses   Neuro: CN II-XII grossly intact, no focal deficit   Skin: No rashes, ulcers or photosensitive areas   MSK: Upper Extremities: Strength 5/5  Hand/Fingers: No redness, swelling, pain at DIP, PIP, or MCP joints, FROM grossly.   Wrists: no erythema, swelling, or pain at wrist, FROM grossly   Elbows: No swelling, pain, erythema on elbows, FROM grossly   Shoulders: normal ROM bilaterally. TTP anteriorly and laterally bilaterally.   Lower Extremities: Strength 5/5  Hips: No obvious deformities. no joint tenderness, normal ROM grossly   Knees: No pain, deformities, swelling, rashes, warmth, normal ROM grossly   Ankles,  feet: no deformities, swelling, ulceration, warmth, swelling, synovitis at ankle joints, normal ROM grossly.          Assessment/Plan   Diagnoses and all orders for this visit:  Polymyalgia rheumatica (Multi)  -     XR DEXA bone density; Future  -     Sedimentation Rate; Future  -     C-Reactive Protein; Future  -     predniSONE (Deltasone) 1 mg tablet; Take 10 tablets (10 mg) by mouth once daily for 30 days, THEN 9 tablets (9 mg) once daily for 30 days, THEN 8 tablets (8 mg) once daily for 30 days, THEN 7 tablets (7 mg) once daily for 30 days, THEN 6 tablets (6 mg) once daily.  -     methotrexate (Trexall) 2.5 mg tablet; Take 6 tablets (15 mg total) by mouth 1 (one) time per week.  Follow directions carefully, and ask to explain any part you do not understand. Take exactly as directed.  -     folic acid (Folvite) 1 mg tablet; Take 1 tablet (1 mg) by mouth once daily.  -     Point of Care Ultrasound  Osteoporosis  -     XR DEXA bone density; Future       Ms. Huynh is a 68 year old male with a PMH of PFO s/p repair with [place] 2010, prior stroke, HLD, migraines presenting for follow up of PMR.     Her symptoms began around 6 months ago, stiffness and weakness in neck, shoulders, hips. Labs with elevated ESR, CRP, had a remarkable improvement in symptoms just hours after taking Prednisone, Symptoms almost completely resolved. Regarding Prednisone took 15 mg daily for a month, and then 20 mg daily for a month. She is currently taking tapered to Prednisone 12.5 mg daily. No visual symptoms, vision loss, headaches (does have chronic migraines), jaw pain, scalp tenderness.      Labs have normalized. ESR, CRP normal today. However given bilateral shoulder pain, US was done which showed moderate subacromial effusion on the left, pointing to residual inflammation, sequelae of PMR.      PLAN:  - Will initiate methotrexate 15 mg weekly, with folic acid, to help taper off prednisone    - Continue PMR taper as outlined:  Take  10 tablets (10 mg) by mouth once daily for 30 days, THEN 9 tablets (9 mg) once daily for 30 days, THEN 8 tablets (8 mg) once daily for 30 days, THEN 7 tablets (7 mg) once daily for 30 days, THEN 6 tablets (6 mg) once daily.  - ESR, CRP normal today  - Follow up in 2-3 months  - DEXA ordered today, advised to get covid, flu vaccinations    Case discussed with Dr. Hawley     Performed by: Jatinder Hawley MD  Authorized by: Jatinder Hawley MD            Musculoskeletal Indications: limited range of motion      Procedure: MSK Ultrasound    Views:  Joint: The Bilateral shoulder joint was evaluated.        Findings:    Ligament/Tendon: Abnormal attachments or abnormal fibers were visualized.        Impression:  MSK: The focused MSK ultrasound exam was POSITIVE for abnormalities as specified.    Comments: - mild to moderate fluid in the tenosynovial sheath along the Rt long head of biceps without intra-substance tear or doppler  - Moderate subacromial effusion in the left subdeltoid/subacromial space best viewed in the modified CRAS position. Supraspinatus tendon normal in appearance.          Eben Kim DO 09/09/24 10:36 AM

## 2024-09-09 NOTE — PROGRESS NOTES
I saw and evaluated the patient. I personally obtained the key and critical portions of the history and physical exam or was physically present for key and critical portions performed by the resident/fellow. I reviewed the resident/fellow's documentation and discussed the patient with the resident/fellow. I agree with the resident/fellow's medical decision making as documented in the note.    PMR with residual symptoms on 12.5 mg prednisone  Along with US evidence of ongoing inflammation ( bilateral biceps tenosynovial effusions R>L and left sided subdeltoid/subacromial fluid collection - see US images on PACS) , no LVV concerns   Will add Steroid sparing agent with slow prednisone taper    Follow up x 2 months      Jatinder Hawley MD

## 2024-09-10 ENCOUNTER — HOSPITAL ENCOUNTER (OUTPATIENT)
Dept: RADIOLOGY | Facility: HOSPITAL | Age: 69
Discharge: HOME | End: 2024-09-10
Payer: MEDICARE

## 2024-09-10 PROCEDURE — 70551 MRI BRAIN STEM W/O DYE: CPT

## 2024-09-10 PROCEDURE — 70551 MRI BRAIN STEM W/O DYE: CPT | Performed by: RADIOLOGY

## 2024-09-24 ENCOUNTER — APPOINTMENT (OUTPATIENT)
Dept: RADIOLOGY | Facility: HOSPITAL | Age: 69
End: 2024-09-24
Payer: MEDICARE

## 2024-09-24 ENCOUNTER — APPOINTMENT (OUTPATIENT)
Dept: RADIOLOGY | Facility: CLINIC | Age: 69
End: 2024-09-24
Payer: MEDICARE

## 2024-09-30 ENCOUNTER — APPOINTMENT (OUTPATIENT)
Dept: PRIMARY CARE | Facility: CLINIC | Age: 69
End: 2024-09-30
Payer: COMMERCIAL

## 2024-10-11 ENCOUNTER — APPOINTMENT (OUTPATIENT)
Dept: CARDIOLOGY | Facility: CLINIC | Age: 69
End: 2024-10-11
Payer: MEDICARE

## 2024-10-11 ENCOUNTER — APPOINTMENT (OUTPATIENT)
Dept: DERMATOLOGY | Facility: CLINIC | Age: 69
End: 2024-10-11
Payer: COMMERCIAL

## 2024-10-17 ENCOUNTER — APPOINTMENT (OUTPATIENT)
Dept: CARDIOLOGY | Facility: CLINIC | Age: 69
End: 2024-10-17
Payer: COMMERCIAL

## 2024-10-29 ENCOUNTER — HOSPITAL ENCOUNTER (OUTPATIENT)
Dept: RADIOLOGY | Facility: CLINIC | Age: 69
Discharge: HOME | End: 2024-10-29
Payer: MEDICARE

## 2024-10-29 DIAGNOSIS — M35.3 POLYMYALGIA RHEUMATICA (MULTI): ICD-10-CM

## 2024-10-29 DIAGNOSIS — M81.0 OSTEOPOROSIS: ICD-10-CM

## 2024-10-29 PROCEDURE — 77080 DXA BONE DENSITY AXIAL: CPT

## 2024-10-29 PROCEDURE — 77080 DXA BONE DENSITY AXIAL: CPT | Performed by: RADIOLOGY

## 2024-10-31 ENCOUNTER — APPOINTMENT (OUTPATIENT)
Dept: CARDIOLOGY | Facility: CLINIC | Age: 69
End: 2024-10-31
Payer: MEDICARE

## 2024-11-11 ENCOUNTER — TELEPHONE (OUTPATIENT)
Dept: CARDIOLOGY | Facility: CLINIC | Age: 69
End: 2024-11-11
Payer: MEDICARE

## 2024-11-11 DIAGNOSIS — I10 HYPERTENSION, ESSENTIAL, BENIGN: ICD-10-CM

## 2024-11-11 DIAGNOSIS — K21.9 GASTROESOPHAGEAL REFLUX DISEASE WITHOUT ESOPHAGITIS: ICD-10-CM

## 2024-11-11 DIAGNOSIS — J45.909 MILD ASTHMA, UNSPECIFIED WHETHER COMPLICATED, UNSPECIFIED WHETHER PERSISTENT (HHS-HCC): ICD-10-CM

## 2024-11-11 DIAGNOSIS — R06.09 DOE (DYSPNEA ON EXERTION): ICD-10-CM

## 2024-11-11 DIAGNOSIS — Q21.12 PFO (PATENT FORAMEN OVALE) (HHS-HCC): ICD-10-CM

## 2024-11-11 DIAGNOSIS — R09.89 BILATERAL CAROTID BRUITS: ICD-10-CM

## 2024-11-11 DIAGNOSIS — R42 LIGHT HEADEDNESS: ICD-10-CM

## 2024-11-11 DIAGNOSIS — R00.2 PALPITATIONS: ICD-10-CM

## 2024-11-11 NOTE — TELEPHONE ENCOUNTER
----- Message from Gunjan CASTILLO sent at 2024 10:17 AM EST -----  Patient was to have an echo done this month but patient can not afford to have this done right now and is moving it to January. Central scheduling needs to reschedule this, but the echo order will  for January and so a new order needs to be placed.

## 2024-11-15 ENCOUNTER — TELEPHONE (OUTPATIENT)
Dept: RHEUMATOLOGY | Facility: CLINIC | Age: 69
End: 2024-11-15
Payer: MEDICARE

## 2024-11-18 ENCOUNTER — APPOINTMENT (OUTPATIENT)
Dept: RHEUMATOLOGY | Facility: CLINIC | Age: 69
End: 2024-11-18
Payer: MEDICARE

## 2024-11-18 DIAGNOSIS — K21.9 GASTROESOPHAGEAL REFLUX DISEASE WITHOUT ESOPHAGITIS: ICD-10-CM

## 2024-11-18 DIAGNOSIS — M35.3 POLYMYALGIA RHEUMATICA (MULTI): ICD-10-CM

## 2024-11-18 DIAGNOSIS — I10 HYPERTENSION, ESSENTIAL, BENIGN: ICD-10-CM

## 2024-11-18 RX ORDER — METHOTREXATE 2.5 MG/1
15 TABLET ORAL WEEKLY
Qty: 60 TABLET | Refills: 1 | Status: CANCELLED | OUTPATIENT
Start: 2024-11-18 | End: 2025-03-18

## 2024-11-18 NOTE — TELEPHONE ENCOUNTER
Patient is requesting refill on medications she is all out of both medications MWV scheduled 2/10/25

## 2024-11-20 RX ORDER — LISINOPRIL 10 MG/1
10 TABLET ORAL DAILY
Qty: 90 TABLET | Refills: 0 | Status: SHIPPED | OUTPATIENT
Start: 2024-11-20

## 2024-11-21 ENCOUNTER — APPOINTMENT (OUTPATIENT)
Dept: CARDIOLOGY | Facility: CLINIC | Age: 69
End: 2024-11-21
Payer: MEDICARE

## 2024-11-21 ENCOUNTER — HOSPITAL ENCOUNTER (OUTPATIENT)
Dept: CARDIOLOGY | Facility: CLINIC | Age: 69
Discharge: HOME | End: 2024-11-21
Payer: MEDICARE

## 2024-11-21 DIAGNOSIS — R42 LIGHT HEADEDNESS: ICD-10-CM

## 2024-11-21 DIAGNOSIS — R09.89 BILATERAL CAROTID BRUITS: ICD-10-CM

## 2024-11-21 DIAGNOSIS — K21.9 GASTROESOPHAGEAL REFLUX DISEASE WITHOUT ESOPHAGITIS: ICD-10-CM

## 2024-11-21 DIAGNOSIS — R06.09 DOE (DYSPNEA ON EXERTION): ICD-10-CM

## 2024-11-21 DIAGNOSIS — I63.9 CEREBELLAR INFARCTION (MULTI): ICD-10-CM

## 2024-11-21 DIAGNOSIS — E03.9 ACQUIRED HYPOTHYROIDISM: ICD-10-CM

## 2024-11-21 DIAGNOSIS — M79.10 MYALGIA: ICD-10-CM

## 2024-11-21 DIAGNOSIS — J45.909 MILD ASTHMA, UNSPECIFIED WHETHER COMPLICATED, UNSPECIFIED WHETHER PERSISTENT (HHS-HCC): ICD-10-CM

## 2024-11-21 DIAGNOSIS — Q21.12 PFO (PATENT FORAMEN OVALE) (HHS-HCC): ICD-10-CM

## 2024-11-21 DIAGNOSIS — I10 HYPERTENSION, ESSENTIAL, BENIGN: ICD-10-CM

## 2024-11-21 DIAGNOSIS — R00.2 PALPITATIONS: ICD-10-CM

## 2024-11-21 PROCEDURE — 93321 DOPPLER ECHO F-UP/LMTD STD: CPT | Performed by: INTERNAL MEDICINE

## 2024-11-21 PROCEDURE — 93325 DOPPLER ECHO COLOR FLOW MAPG: CPT

## 2024-11-21 PROCEDURE — 93325 DOPPLER ECHO COLOR FLOW MAPG: CPT | Performed by: INTERNAL MEDICINE

## 2024-11-21 PROCEDURE — 93308 TTE F-UP OR LMTD: CPT | Performed by: INTERNAL MEDICINE

## 2024-11-22 LAB
EJECTION FRACTION APICAL 4 CHAMBER: 64.1
EJECTION FRACTION: 62 %

## 2024-11-22 RX ORDER — OMEPRAZOLE 20 MG/1
20 CAPSULE, DELAYED RELEASE ORAL DAILY
Qty: 90 CAPSULE | Refills: 1 | Status: SHIPPED | OUTPATIENT
Start: 2024-11-22

## 2024-11-22 NOTE — TELEPHONE ENCOUNTER
I spoke with patient, she needs Omeprazole, not Methotrexate sent to [facility] pharm. Medication pended, she is out and is having an increase in symptoms

## 2024-12-08 PROBLEM — D84.821 IMMUNOSUPPRESSION DUE TO DRUG THERAPY: Status: RESOLVED | Noted: 2024-09-09 | Resolved: 2024-12-08

## 2024-12-08 PROBLEM — Z79.899 IMMUNOSUPPRESSION DUE TO DRUG THERAPY: Status: RESOLVED | Noted: 2024-09-09 | Resolved: 2024-12-08

## 2024-12-10 DIAGNOSIS — F41.9 ANXIETY: ICD-10-CM

## 2024-12-11 ENCOUNTER — APPOINTMENT (OUTPATIENT)
Dept: NEUROLOGY | Facility: CLINIC | Age: 69
End: 2024-12-11
Payer: COMMERCIAL

## 2024-12-12 ENCOUNTER — APPOINTMENT (OUTPATIENT)
Dept: CARDIOLOGY | Facility: CLINIC | Age: 69
End: 2024-12-12
Payer: MEDICARE

## 2024-12-12 RX ORDER — PAROXETINE HYDROCHLORIDE 40 MG/1
40 TABLET, FILM COATED ORAL DAILY
Qty: 90 TABLET | Refills: 0 | Status: SHIPPED | OUTPATIENT
Start: 2024-12-12

## 2024-12-19 ENCOUNTER — APPOINTMENT (OUTPATIENT)
Dept: RHEUMATOLOGY | Facility: CLINIC | Age: 69
End: 2024-12-19
Payer: MEDICARE

## 2024-12-23 ENCOUNTER — APPOINTMENT (OUTPATIENT)
Dept: RHEUMATOLOGY | Facility: CLINIC | Age: 69
End: 2024-12-23
Payer: MEDICARE

## 2024-12-23 VITALS
WEIGHT: 215 LBS | BODY MASS INDEX: 31.75 KG/M2 | HEART RATE: 98 BPM | DIASTOLIC BLOOD PRESSURE: 90 MMHG | SYSTOLIC BLOOD PRESSURE: 126 MMHG

## 2024-12-23 DIAGNOSIS — M81.0 OSTEOPOROSIS, UNSPECIFIED OSTEOPOROSIS TYPE, UNSPECIFIED PATHOLOGICAL FRACTURE PRESENCE: ICD-10-CM

## 2024-12-23 DIAGNOSIS — M35.3 POLYMYALGIA RHEUMATICA (MULTI): Primary | ICD-10-CM

## 2024-12-23 PROCEDURE — 99214 OFFICE O/P EST MOD 30 MIN: CPT | Performed by: STUDENT IN AN ORGANIZED HEALTH CARE EDUCATION/TRAINING PROGRAM

## 2024-12-23 PROCEDURE — 1036F TOBACCO NON-USER: CPT | Performed by: STUDENT IN AN ORGANIZED HEALTH CARE EDUCATION/TRAINING PROGRAM

## 2024-12-23 PROCEDURE — 3074F SYST BP LT 130 MM HG: CPT | Performed by: STUDENT IN AN ORGANIZED HEALTH CARE EDUCATION/TRAINING PROGRAM

## 2024-12-23 PROCEDURE — 3080F DIAST BP >= 90 MM HG: CPT | Performed by: STUDENT IN AN ORGANIZED HEALTH CARE EDUCATION/TRAINING PROGRAM

## 2024-12-23 PROCEDURE — 1159F MED LIST DOCD IN RCRD: CPT | Performed by: STUDENT IN AN ORGANIZED HEALTH CARE EDUCATION/TRAINING PROGRAM

## 2024-12-23 RX ORDER — PREDNISONE 1 MG/1
TABLET ORAL
Qty: 645 TABLET | Refills: 0 | Status: SHIPPED | OUTPATIENT
Start: 2024-12-23 | End: 2025-05-07

## 2024-12-23 RX ORDER — ALENDRONATE SODIUM 70 MG/1
70 TABLET ORAL
Qty: 4 TABLET | Refills: 11 | Status: SHIPPED | OUTPATIENT
Start: 2024-12-23 | End: 2025-12-23

## 2024-12-23 ASSESSMENT — ENCOUNTER SYMPTOMS
DEPRESSION: 0
OCCASIONAL FEELINGS OF UNSTEADINESS: 0
LOSS OF SENSATION IN FEET: 0

## 2024-12-23 ASSESSMENT — PATIENT HEALTH QUESTIONNAIRE - PHQ9
10. IF YOU CHECKED OFF ANY PROBLEMS, HOW DIFFICULT HAVE THESE PROBLEMS MADE IT FOR YOU TO DO YOUR WORK, TAKE CARE OF THINGS AT HOME, OR GET ALONG WITH OTHER PEOPLE: NOT DIFFICULT AT ALL
1. LITTLE INTEREST OR PLEASURE IN DOING THINGS: SEVERAL DAYS
SUM OF ALL RESPONSES TO PHQ9 QUESTIONS 1 & 2: 2
2. FEELING DOWN, DEPRESSED OR HOPELESS: SEVERAL DAYS

## 2024-12-23 NOTE — PROGRESS NOTES
Subjective   Patient ID: Rosie Huynh is a 69 y.o. female who presents for Follow-up.  HPI  The patient is a 68 year old female here for follow up of PMR.  PMR recently diagnosed in March 2024, has been tapering off Prednisone.     Interval history:  - Today she notes that overall she is doing well, pain and stiffness has significantly improved since last visit.     Weakness has resolved. No headaches, jaw pain, vision issues, scalp tenderness. No difficulty with walking, climbing stairs. No fevers, weight changes. No n/v/gi issues.     Currently on PRD 7 mg daily. No significant stiffness in shoulders, pain throughout the day.          Prior recall:   PMH: History of PFO s/p repair with [place] 2010, prior stroke, HLD, migraines       She notes pain in her neck which started in March 2024, was unable to lift arms. Started having pain in her hips and thighs. This was very sudden in nature, started abruptly. Denies any joint swelling pain or pain, has more generalized pain in stiffness in shoulders, neck and upper arms. No visual symptoms, vision loss, headaches (does have chronic migraines), jaw pain, scalp tenderness. She had a remarkable improvement in symptoms just hours after taking Prednisone, Symptoms almost completely resolved.      - Regarding Prednisone took 15 mg daily for a month, and then 20 mg daily for a month. She is currently taking Prednisone 20 mg daily.      No rashes, raynauds, oral ulcers, SOB, chest pain, GI/  symptoms      Labs reviewed:  - ESR 50, CRP 1.84  - CBC with normal WBC, Hgb, PLT, but has had an elevation in PLT and drop in Hgb as compared to her baseline  - CMP stable     Imaging:  Right hand X-ray:  There is no displaced fracture.  The alignment is anatomic.  There is  a joint space narrowing noted involving the DIP joints.  There is  evidence of bony demineralization.  No soft tissue abnormality is  seen.     Family history:  - No known autoimmune history     Social  history:  - Does not smoke, occasional alcohol use, worked as a wallpaper          Review of Systems    Constitutional: Denies fever, chills   Eyes: Denies dry eyes, blurry vision, redness of the eyes, pain in the eyes   ENT: Denies dry mouth, loss of taste, sores in the mouth, or difficulty swallowing   Cardiovascular: Denies chest pain, palpitations   Respiratory: Denies shortness of breath   Gastrointestinal: Denies changes in bowl or bladder function, nausea, vomiting, heartburn   Integumentary: Denies photosensitivity, rash or lesions, Raynaud's   Neurological: Denies any numbness or tingling   Hematologic/Lymphatic: Denies bleeding, alopecia, Raynaud's phenomena   MSK: As per HPI. Denies weakness, difficulty rising from chair, aches, problems with hand    Objective   Physical Exam  General: AAOx3. Cooperative.   Head: normocephalic, atraumatic   Eyes: EOMI, conjunctiva clear, sclera white, anicteric   Ears: hearing intact   Nose: no deformity   Throat/Mouth: No oral deformities. Mucosa appear moist. No oral ulcers noted.   Neck/Lymph: FROM. trachea midline   Lungs: chest expansion symmetric Clear to auscultation bilaterally. No wheezing, rhonchi, stridor.   Heart: S1, S2, RRR. No murmur, rub.   Abdomen: Soft, non-tender without masses   Neuro: CN II-XII grossly intact, no focal deficit   Skin: No rashes, ulcers or photosensitive areas   MSK: Upper Extremities: Strength 5/5  Hand/Fingers: No redness, swelling, pain at DIP, PIP, or MCP joints, FROM grossly.   Wrists: no erythema, swelling, or pain at wrist, FROM grossly   Elbows: No swelling, pain, erythema on elbows, FROM grossly   Shoulders: normal ROM bilaterally. TTP anteriorly and laterally bilaterally.   Lower Extremities: Strength 5/5  Hips: No obvious deformities. no joint tenderness, normal ROM grossly   Knees: No pain, deformities, swelling, rashes, warmth, normal ROM grossly   Ankles, feet: no deformities, swelling, ulceration,  warmth, swelling, synovitis at ankle joints, normal ROM grossly.  Assessment/Plan   Diagnoses and all orders for this visit:  Polymyalgia rheumatica (Multi)  -     CBC and Auto Differential; Future  -     Comprehensive Metabolic Panel; Future  -     C-Reactive Protein; Future  -     predniSONE (Deltasone) 1 mg tablet; Take 7 tablets (7 mg) by mouth once daily for 15 days, THEN 6 tablets (6 mg) once daily for 30 days, THEN 5 tablets (5 mg) once daily for 30 days, THEN 4 tablets (4 mg) once daily for 30 days, THEN 3 tablets (3 mg) once daily.  Osteoporosis, unspecified osteoporosis type, unspecified pathological fracture presence  -     alendronate (Fosamax) 70 mg tablet; Take 1 tablet (70 mg) by mouth every 7 days. Take in the morning with a full glass of water, on an empty stomach, and do not take anything else by mouth or lie down for the next 30 min.       Ms. Huynh is a 68 year old male with a PMH of PFO s/p repair with [place] 2010, prior stroke, HLD, migraines presenting for follow up of PMR. Was last seen on 9/9/24 at which time, plan was to start on Methotrexate 10 mg weekly, with folic acid, however patient noted significant improvement in pain and never took started it.      Her symptoms began around 6 months ago, stiffness and weakness in neck, shoulders, hips. Labs with elevated ESR, CRP, had a remarkable improvement in symptoms just hours after taking Prednisone, Symptoms almost completely resolved. Regarding Prednisone took 15 mg daily for a month, and then 20 mg daily for a month. She is currently taking tapered to Prednisone 12.5 mg daily. No visual symptoms, vision loss, headaches (does have chronic migraines), jaw pain, scalp tenderness.      Labs have normalized. ESR, CRP normal at last visit. At last visit, shoulder US was done which showed moderate subacromial effusion on the left, pointing to residual inflammation, sequelae of PMR.     - Doing significantly better today, on PRD 7 mg daily      DEXA 2024 : Lowest T -1.6 at femoral neck. MOP fracture risk 14%, hip fracture risk: 2.4%     PLAN:  - Continue PMR taper as outlined:  predniSONE (Deltasone) 1 mg tablet; Take 7 tablets (7 mg) by mouth once daily for 15 days, THEN 6 tablets (6 mg) once daily for 30 days, THEN 5 tablets (5 mg) once daily for 30 days, THEN 4 tablets (4 mg) once daily for 30 days, THEN 3 tablets (3 mg) once daily.  - Hold off Methotrexate for now, patient never started it  - Repeat surveillance labs today   - DEXA as mentioned above:   Osteopenia: Lowest T -1.6 at femoral neck. MOP fracture risk 14%, hip fracture risk: 2.4%  will start on Alendronate as she qualifies for treatment: moderate risk/ chronic steroid use. Med discussed and sent to pharmacy   - Follow up in 3-4 months      Case discussed with Dr. Marleen Kim DO 12/23/24 1:31 PM

## 2024-12-23 NOTE — PROGRESS NOTES
I saw and evaluated the patient. I personally obtained the key and critical portions of the history and physical exam or was physically present for key and critical portions performed by the resident/fellow. I reviewed the resident/fellow's documentation and discussed the patient with the resident/fellow. I agree with the resident/fellow's medical decision making as documented in the note.    Jatinder Hawley MD     of Medicine  [facility] - Department of Rheumatology  [facility]

## 2024-12-31 ENCOUNTER — APPOINTMENT (OUTPATIENT)
Dept: NEUROLOGY | Facility: CLINIC | Age: 69
End: 2024-12-31
Payer: MEDICARE

## 2025-01-09 ENCOUNTER — APPOINTMENT (OUTPATIENT)
Dept: CARDIOLOGY | Facility: CLINIC | Age: 70
End: 2025-01-09
Payer: MEDICARE

## 2025-01-13 ENCOUNTER — APPOINTMENT (OUTPATIENT)
Dept: NEUROLOGY | Facility: CLINIC | Age: 70
End: 2025-01-13
Payer: MEDICARE

## 2025-01-13 VITALS
BODY MASS INDEX: 31.75 KG/M2 | HEART RATE: 89 BPM | HEIGHT: 69 IN | DIASTOLIC BLOOD PRESSURE: 80 MMHG | SYSTOLIC BLOOD PRESSURE: 126 MMHG | TEMPERATURE: 94.7 F

## 2025-01-13 DIAGNOSIS — R41.89 COGNITIVE CHANGES: Primary | ICD-10-CM

## 2025-01-13 PROCEDURE — 3074F SYST BP LT 130 MM HG: CPT | Performed by: PSYCHIATRY & NEUROLOGY

## 2025-01-13 PROCEDURE — 1036F TOBACCO NON-USER: CPT | Performed by: PSYCHIATRY & NEUROLOGY

## 2025-01-13 PROCEDURE — 1159F MED LIST DOCD IN RCRD: CPT | Performed by: PSYCHIATRY & NEUROLOGY

## 2025-01-13 PROCEDURE — 3079F DIAST BP 80-89 MM HG: CPT | Performed by: PSYCHIATRY & NEUROLOGY

## 2025-01-13 PROCEDURE — 99214 OFFICE O/P EST MOD 30 MIN: CPT | Performed by: PSYCHIATRY & NEUROLOGY

## 2025-01-13 PROCEDURE — G2211 COMPLEX E/M VISIT ADD ON: HCPCS | Performed by: PSYCHIATRY & NEUROLOGY

## 2025-01-13 ASSESSMENT — PATIENT HEALTH QUESTIONNAIRE - PHQ9
1. LITTLE INTEREST OR PLEASURE IN DOING THINGS: MORE THAN HALF THE DAYS
5. POOR APPETITE OR OVEREATING: NOT AT ALL
SUM OF ALL RESPONSES TO PHQ QUESTIONS 1-9: 13
3. TROUBLE FALLING OR STAYING ASLEEP: NEARLY EVERY DAY
SUM OF ALL RESPONSES TO PHQ9 QUESTIONS 1 & 2: 5
7. TROUBLE CONCENTRATING ON THINGS, SUCH AS READING THE NEWSPAPER OR WATCHING TELEVISION: NOT AT ALL
6. FEELING BAD ABOUT YOURSELF - OR THAT YOU ARE A FAILURE OR HAVE LET YOURSELF OR YOUR FAMILY DOWN: NEARLY EVERY DAY
2. FEELING DOWN, DEPRESSED OR HOPELESS: NEARLY EVERY DAY
9. THOUGHTS THAT YOU WOULD BE BETTER OFF DEAD, OR OF HURTING YOURSELF: NOT AT ALL
4. FEELING TIRED OR HAVING LITTLE ENERGY: NOT AT ALL
8. MOVING OR SPEAKING SO SLOWLY THAT OTHER PEOPLE COULD HAVE NOTICED. OR THE OPPOSITE, BEING SO FIGETY OR RESTLESS THAT YOU HAVE BEEN MOVING AROUND A LOT MORE THAN USUAL: MORE THAN HALF THE DAYS
10. IF YOU CHECKED OFF ANY PROBLEMS, HOW DIFFICULT HAVE THESE PROBLEMS MADE IT FOR YOU TO DO YOUR WORK, TAKE CARE OF THINGS AT HOME, OR GET ALONG WITH OTHER PEOPLE: SOMEWHAT DIFFICULT

## 2025-01-13 ASSESSMENT — LIFESTYLE VARIABLES
HOW OFTEN DO YOU HAVE SIX OR MORE DRINKS ON ONE OCCASION: NEVER
SKIP TO QUESTIONS 9-10: 1
HOW MANY STANDARD DRINKS CONTAINING ALCOHOL DO YOU HAVE ON A TYPICAL DAY: 1 OR 2
AUDIT-C TOTAL SCORE: 1
HOW OFTEN DO YOU HAVE A DRINK CONTAINING ALCOHOL: MONTHLY OR LESS

## 2025-01-13 NOTE — PROGRESS NOTES
Chief Complaint: Memory Difficulty    HPI  69 y.o. female presenting for follow up regarding memory difficulty.    Since the last visit, she continues to have memory difficulty.  She notes difficulty remembering faces.  She also notes word finding difficulty.  She may do silly things (I.e. she puts her hot coffee pot in the refrigerator).  She is often misplacing things.  She will occasionally forget recent conversations.  Other times, she will forget the location of an appointment.  She does not drive (due to retinal issues).   She has a  history of anxiety - she notes that it is under control.  She does live alone.  She manages her bills/finances and medications.  Of note, she has a history of ADHD.  She is currently not on any medications for this.          Current Outpatient Medications:     albuterol 90 mcg/actuation inhaler, Inhale., Disp: , Rfl:     aspirin 81 mg EC tablet, Take 1 tablet (81 mg) by mouth once daily., Disp: , Rfl:     clonazePAM (KlonoPIN) 0.5 mg tablet, Take 1 tablet (0.5 mg) by mouth 2 times a day as needed for anxiety., Disp: 20 tablet, Rfl: 0    cyanocobalamin (Vitamin B-12) 50 mcg tablet, Take 1 tablet (50 mcg) by mouth once daily., Disp: , Rfl:     folic acid (Folvite) 1 mg tablet, Take 1 tablet (1 mg) by mouth once daily., Disp: 30 tablet, Rfl: 11    lisinopril 10 mg tablet, Take 1 tablet (10 mg) by mouth once daily., Disp: 90 tablet, Rfl: 0    omeprazole (PriLOSEC) 20 mg DR capsule, Take 1 capsule (20 mg) by mouth once daily., Disp: 90 capsule, Rfl: 1    PARoxetine (Paxil) 40 mg tablet, TAKE ONE TABLET BY MOUTH once daily., Disp: 90 tablet, Rfl: 0    predniSONE (Deltasone) 1 mg tablet, Take 7 tablets (7 mg) by mouth once daily for 15 days, THEN 6 tablets (6 mg) once daily for 30 days, THEN 5 tablets (5 mg) once daily for 30 days, THEN 4 tablets (4 mg) once daily for 30 days, THEN 3 tablets (3 mg) once daily., Disp: 645 tablet, Rfl: 0    pyridoxine (Vitamin B-6) 50 mg tablet, Take 1  tablet (50 mg) by mouth once daily., Disp: , Rfl:     alendronate (Fosamax) 70 mg tablet, Take 1 tablet (70 mg) by mouth every 7 days. Take in the morning with a full glass of water, on an empty stomach, and do not take anything else by mouth or lie down for the next 30 min., Disp: 4 tablet, Rfl: 11    amoxicillin (Amoxil) 500 mg capsule, 2000 mg po one dose prior to procedure, Disp: 4 capsule, Rfl: 1    cyclobenzaprine (Flexeril) 10 mg tablet, Take 1 tablet (10 mg) by mouth 3 times a day as needed for muscle spasms., Disp: 30 tablet, Rfl: 1    ezetimibe (Zetia) 10 mg tablet, Take 1 tablet (10 mg) by mouth once daily., Disp: 30 tablet, Rfl: 5    sodium,potassium,mag sulfates (Suprep) 17.5-3.13-1.6 gram recon soln solution, Take one bottle twice as directed by the prep instructions (Patient not taking: Reported on 1/13/2025), Disp: 1 each, Rfl: 0      Objective:  Gen: NAD  Neuro:  --HIF:   Mini-Mental Status Exam:  Orientation to Time (Year, Season, Month, Date, Day of Week): 5  Orientation to Place (State, County, City, Name of Place, Floor):  5  Registration (Apple, Table, Samantha): 3  Attention (Spell 'World' backwards):   Delayed Verbal Recall: 3  Naming (Watch, Pen): 2  Repetition (No Ifs, Ands, or Buts): 1  Follows command with crossover: 2  Read a sentence: 1  Write a sentence: 1  Copy a figure: 1  Total Score: 27    --CN:  PERRLA, EOMI, VFF, no visible facial asymmetry, facial sensation intact, no tongue or palatal deviation, SCM intact  --Motor: Moves all 4 extremities equally; no focal deficits  --Sensory: Intact to light touch, intact to pinprick  --Reflex: 2+ symmetric, toes down  --Cerebellum: FTN and HTS intact  --Gait: Normal, narrow based gait    Relevant Results  MRI Brain (I personally reviewed the images/tracings with the following interpretation)  Chronic right cerebellar infarct    Vitamin B12: 244    Assessment:    Cognitive Difficulty  - the patient has had cognitive difficulty for the past  several years  - on exam, her MMSE was 27/30  - ddx includes MCI, ADHD, pseudodementia (due to anxiety), B12 deficiency     Plan:  - recommend rechecking B12 level (she is on oral supplements currently)  - encouraged patient to participate in mentally stimulating activities (i.e. crossword puzzles, sudoku puzzles, etc)  - encouraged physical exercise (which has been shown to be beneficial for memory health)  - recommend mediterranean diet      Burke Tom MD  [facility]  Department of Neurology

## 2025-01-30 ENCOUNTER — APPOINTMENT (OUTPATIENT)
Dept: CARDIOLOGY | Facility: CLINIC | Age: 70
End: 2025-01-30
Payer: MEDICARE

## 2025-02-06 ENCOUNTER — APPOINTMENT (OUTPATIENT)
Dept: CARDIOLOGY | Facility: CLINIC | Age: 70
End: 2025-02-06
Payer: MEDICARE

## 2025-02-07 ENCOUNTER — TELEPHONE (OUTPATIENT)
Dept: CARDIOLOGY | Facility: CLINIC | Age: 70
End: 2025-02-07
Payer: MEDICARE

## 2025-02-07 NOTE — TELEPHONE ENCOUNTER
Called patient to cancel her appointment for 2/6/25 with Dr Steve due to no testing was done for her follow up appointment. Patient stated she was going to get testing done and make a follow up appointment with Dr Steve.  I told patient if she had any questions to call the office at [phone].

## 2025-02-10 ENCOUNTER — APPOINTMENT (OUTPATIENT)
Dept: PRIMARY CARE | Facility: CLINIC | Age: 70
End: 2025-02-10
Payer: MEDICARE

## 2025-03-05 ENCOUNTER — APPOINTMENT (OUTPATIENT)
Dept: DERMATOLOGY | Facility: CLINIC | Age: 70
End: 2025-03-05
Payer: MEDICARE

## 2025-03-05 DIAGNOSIS — D22.9 MELANOCYTIC NEVUS, UNSPECIFIED LOCATION: ICD-10-CM

## 2025-03-05 DIAGNOSIS — L81.4 LENTIGO: ICD-10-CM

## 2025-03-05 DIAGNOSIS — L82.1 SEBORRHEIC KERATOSIS: ICD-10-CM

## 2025-03-05 DIAGNOSIS — L90.5 SCAR CONDITIONS AND FIBROSIS OF SKIN: Primary | ICD-10-CM

## 2025-03-05 DIAGNOSIS — D48.5 NEOPLASM OF UNCERTAIN BEHAVIOR OF SKIN: ICD-10-CM

## 2025-03-05 DIAGNOSIS — Z85.828 PERSONAL HISTORY OF SKIN CANCER: ICD-10-CM

## 2025-03-05 DIAGNOSIS — D18.01 HEMANGIOMA OF SKIN: ICD-10-CM

## 2025-03-05 PROCEDURE — 11102 TANGNTL BX SKIN SINGLE LES: CPT | Performed by: DERMATOLOGY

## 2025-03-05 PROCEDURE — 1159F MED LIST DOCD IN RCRD: CPT | Performed by: DERMATOLOGY

## 2025-03-05 PROCEDURE — 99213 OFFICE O/P EST LOW 20 MIN: CPT | Performed by: DERMATOLOGY

## 2025-03-05 NOTE — PROGRESS NOTES
Subjective     Rosie Huynh is a 69 y.o. female who presents for the following: Skin Check (FBSE. Hx of BCC. Area of concern to forehead, right cheek, right breast).     Skin Cancer Screening  She has a history of moderate sun exposure. She is in the sun daily. She uses sunscreen occasionally. She reports no skin symptoms. Her moles are increasing in number of lesions.    Spots that concern her: forehead, right cheek, right breast    Hx of BCC     Review of Systems:  No other skin or systemic complaints other than what is documented elsewhere in the note.    The following portions of the chart were reviewed this encounter and updated as appropriate:       Skin Cancer History  No skin cancer on file.    Specialty Problems          Dermatology Problems    Basal cell carcinoma of skin of other parts of face    Hemangioma of skin and subcutaneous tissue    Melanocytic nevi of trunk    Melanocytic nevi of unspecified lower limb, including hip    Melanocytic nevi of unspecified upper limb, including shoulder    Neoplasm of uncertain behavior of skin    Other melanin hyperpigmentation    Other seborrheic keratosis    Prurigo nodularis    Scar condition and fibrosis of skin    Skin changes due to chronic exposure to nonionizing radiation, unspecified    Papule of skin     Past Medical History:  Rosie Huynh  has a past medical history of Abnormal computed tomography of pelvis (06/09/2023), Actinic keratoses (06/09/2023), Bleeding hemorrhoid (06/09/2023), Bowel incontinence (06/09/2023), Cerebral infarction due to thrombosis of other cerebral artery, Chronic constipation (06/09/2023), Closed fracture of left distal fibula (07/31/2018), Closed nondisplaced longitudinal fracture of right patella (07/30/2023), Fibroids (06/09/2023), History of basal cell cancer (06/09/2023), Hyperlipidemia (07/08/2010), Migraine with aura and without status migrainosus, not intractable (09/18/2015), Patent foramen ovale ([facility]), and  Personal history of other malignant neoplasm of skin.    Past Surgical History:  Rosie Huynh  has a past surgical history that includes Other surgical history (11/06/2019); Other surgical history (04/07/2021); and Other surgical history (04/05/2021).    Family History:  Patient family history includes Diabetes in her mother.       Objective   Well appearing patient in no apparent distress; mood and affect are within normal limits.    A full examination was performed including scalp, head, eyes, ears, nose, lips, neck, chest, axillae, abdomen, back, buttocks, bilateral upper extremities, bilateral lower extremities, hands, feet, fingers, toes, fingernails, and toenails. All findings within normal limits unless otherwise noted below.    Assessment/Plan   1. Neoplasm of uncertain behavior of skin  Glabella  Pearly papule          Lesion biopsy  Type of biopsy: tangential    Informed consent: discussed and consent obtained    Timeout: patient name, date of birth, surgical site, and procedure verified    Procedure prep:  Patient was prepped and draped  Anesthesia: the lesion was anesthetized in a standard fashion    Anesthetic:  1% lidocaine w/ epinephrine 1-100,000 local infiltration  Instrument used: DermaBlade    Hemostasis achieved with: aluminum chloride    Outcome: patient tolerated procedure well    Post-procedure details: sterile dressing applied and wound care instructions given    Dressing type: petrolatum and bandage      Staff Communication: Dermatology Local Anesthesia: 1 % Lidocaine / Epinephrine - Amount: 1 mL    Specimen 1 - Dermatopathology- DERM LAB  Differential Diagnosis: r/o BCC  Check Margins Yes/No?:    Comments:    Dermpath Lab: Routine Histopathology (formalin-fixed tissue)    2. Hemangioma of skin  Scattered cherry-red papule(s).    3. Lentigo  Scattered tan macules in sun-exposed areas.    The ABCDEs of melanoma and warning signs of non-melanoma skin cancer were discussed with patient and  patient expressed understanding. Sun protection and use of at least SPF 30 discussed with patient. Pt instructed to reapply every 2 hours.     4. Seborrheic keratosis  Stuck on verrucous, tan-brown papules and plaques.      5. Melanocytic nevus, unspecified location  All nevi were symmetric brown macules without atypia on dermoscopy.     The ABCDEs of melanoma and warning signs of non-melanoma skin cancer were discussed with patient and patient expressed understanding. Sun protection and use of at least SPF 30 discussed with patient. Pt instructed to reapply every 2 hours.     6. Scar conditions and fibrosis of skin  Scar is well-healed without evidence of recurrence       Follow up pending biopsy results.

## 2025-03-06 DIAGNOSIS — I10 HYPERTENSION, ESSENTIAL, BENIGN: ICD-10-CM

## 2025-03-07 LAB
LABORATORY COMMENT REPORT: NORMAL
PATH REPORT.FINAL DX SPEC: NORMAL
PATH REPORT.GROSS SPEC: NORMAL
PATH REPORT.MICROSCOPIC SPEC OTHER STN: NORMAL
PATH REPORT.RELEVANT HX SPEC: NORMAL
PATH REPORT.TOTAL CANCER: NORMAL

## 2025-03-07 RX ORDER — LISINOPRIL 10 MG/1
10 TABLET ORAL DAILY
Qty: 30 TABLET | Refills: 0 | Status: SHIPPED | OUTPATIENT
Start: 2025-03-07

## 2025-03-12 DIAGNOSIS — C44.91 BASAL CELL CARCINOMA (BCC), UNSPECIFIED SITE: Primary | ICD-10-CM

## 2025-03-31 ENCOUNTER — PATIENT OUTREACH (OUTPATIENT)
Dept: PRIMARY CARE | Facility: CLINIC | Age: 70
End: 2025-03-31
Payer: MEDICARE

## 2025-03-31 NOTE — PROGRESS NOTES
Discharge Facility:[facility]  Discharge Diagnosis:Acute posthemorrhagic anemia  Admission Date:3/15/25  Discharge Date: 3/28/25    Discharge Facility:[facility]  Discharge Diagnosis:Fall (on)(from) sidewalk curb, initial encounte   Admission Date:3/8/25  Discharge Date: 3/15/25    PCP Appointment Date:No contact made. Message sent to office  Specialist Appointment Date: TBD  Hospital Encounter and Summary Linked: Yes      Hospital Encounter with Shwetha Hernandez DO; Dominga Martinez MD      2 call attempts made

## 2025-04-02 ENCOUNTER — TELEPHONE (OUTPATIENT)
Dept: RHEUMATOLOGY | Facility: CLINIC | Age: 70
End: 2025-04-02
Payer: MEDICARE

## 2025-04-02 NOTE — TELEPHONE ENCOUNTER
Pt called back in stating that she would like to discuss PMR, prednisone, and a fall that resulted in the breaking of her femur. She is requesting a call-see message below

## 2025-04-02 NOTE — TELEPHONE ENCOUNTER
Pt is requesting to personally speak to Dr. Kim today about a private matter regarding herself. Pt can be reached to discuss further at [phone]

## 2025-04-04 ENCOUNTER — TELEPHONE (OUTPATIENT)
Dept: PRIMARY CARE | Facility: CLINIC | Age: 70
End: 2025-04-04
Payer: MEDICARE

## 2025-04-04 NOTE — TELEPHONE ENCOUNTER
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

## 2025-04-15 ENCOUNTER — PATIENT OUTREACH (OUTPATIENT)
Dept: PRIMARY CARE | Facility: CLINIC | Age: 70
End: 2025-04-15
Payer: MEDICARE

## 2025-04-17 ENCOUNTER — TELEPHONE (OUTPATIENT)
Dept: PRIMARY CARE | Facility: CLINIC | Age: 70
End: 2025-04-17
Payer: MEDICARE

## 2025-04-21 DIAGNOSIS — M81.0 OSTEOPOROSIS, UNSPECIFIED OSTEOPOROSIS TYPE, UNSPECIFIED PATHOLOGICAL FRACTURE PRESENCE: ICD-10-CM

## 2025-04-21 RX ORDER — ALENDRONATE SODIUM 70 MG/1
70 TABLET ORAL
Qty: 4 TABLET | Refills: 11 | Status: SHIPPED | OUTPATIENT
Start: 2025-04-21 | End: 2026-04-21

## 2025-05-05 ENCOUNTER — TELEPHONE (OUTPATIENT)
Dept: PRIMARY CARE | Facility: CLINIC | Age: 70
End: 2025-05-05

## 2025-05-05 NOTE — TELEPHONE ENCOUNTER
[facility] form received for signature placed in quick sign folder return fax to [phone]

## 2025-05-07 ENCOUNTER — APPOINTMENT (OUTPATIENT)
Dept: DERMATOLOGY | Facility: CLINIC | Age: 70
End: 2025-05-07
Payer: MEDICARE

## 2025-05-13 ENCOUNTER — APPOINTMENT (OUTPATIENT)
Dept: DERMATOLOGY | Facility: CLINIC | Age: 70
End: 2025-05-13
Payer: MEDICARE

## 2025-05-30 ENCOUNTER — APPOINTMENT (OUTPATIENT)
Dept: PRIMARY CARE | Facility: CLINIC | Age: 70
End: 2025-05-30
Payer: MEDICARE

## 2025-06-17 ENCOUNTER — APPOINTMENT (OUTPATIENT)
Dept: PRIMARY CARE | Facility: CLINIC | Age: 70
End: 2025-06-17
Payer: MEDICARE

## 2025-06-17 VITALS
BODY MASS INDEX: 29.98 KG/M2 | DIASTOLIC BLOOD PRESSURE: 70 MMHG | SYSTOLIC BLOOD PRESSURE: 120 MMHG | WEIGHT: 203 LBS | OXYGEN SATURATION: 100 % | HEART RATE: 52 BPM

## 2025-06-17 DIAGNOSIS — Z63.5 FAMILY ESTRANGEMENT: ICD-10-CM

## 2025-06-17 DIAGNOSIS — F41.9 ANXIETY: ICD-10-CM

## 2025-06-17 DIAGNOSIS — S72.002D CLOSED FRACTURE OF LEFT HIP WITH ROUTINE HEALING, SUBSEQUENT ENCOUNTER: Primary | ICD-10-CM

## 2025-06-17 DIAGNOSIS — I10 HYPERTENSION, ESSENTIAL, BENIGN: ICD-10-CM

## 2025-06-17 DIAGNOSIS — E78.2 MIXED HYPERLIPIDEMIA: ICD-10-CM

## 2025-06-17 DIAGNOSIS — K21.9 GASTROESOPHAGEAL REFLUX DISEASE WITHOUT ESOPHAGITIS: ICD-10-CM

## 2025-06-17 PROCEDURE — 1159F MED LIST DOCD IN RCRD: CPT | Performed by: FAMILY MEDICINE

## 2025-06-17 PROCEDURE — G2211 COMPLEX E/M VISIT ADD ON: HCPCS | Performed by: FAMILY MEDICINE

## 2025-06-17 PROCEDURE — 3078F DIAST BP <80 MM HG: CPT | Performed by: FAMILY MEDICINE

## 2025-06-17 PROCEDURE — 3074F SYST BP LT 130 MM HG: CPT | Performed by: FAMILY MEDICINE

## 2025-06-17 PROCEDURE — 99214 OFFICE O/P EST MOD 30 MIN: CPT | Performed by: FAMILY MEDICINE

## 2025-06-17 PROCEDURE — 1160F RVW MEDS BY RX/DR IN RCRD: CPT | Performed by: FAMILY MEDICINE

## 2025-06-17 PROCEDURE — 1036F TOBACCO NON-USER: CPT | Performed by: FAMILY MEDICINE

## 2025-06-17 RX ORDER — PREDNISONE 1 MG/1
1 TABLET ORAL DAILY
COMMUNITY
Start: 2025-03-16

## 2025-06-17 RX ORDER — PAROXETINE 40 MG/1
40 TABLET, FILM COATED ORAL DAILY
Qty: 90 TABLET | Refills: 1 | Status: SHIPPED | OUTPATIENT
Start: 2025-06-17

## 2025-06-17 RX ORDER — OMEPRAZOLE 20 MG/1
20 CAPSULE, DELAYED RELEASE ORAL
Qty: 90 CAPSULE | Refills: 1 | Status: SHIPPED | OUTPATIENT
Start: 2025-06-17

## 2025-06-17 RX ORDER — EZETIMIBE 10 MG/1
TABLET ORAL
Qty: 90 TABLET | Refills: 1 | Status: SHIPPED | OUTPATIENT
Start: 2025-06-17

## 2025-06-17 RX ORDER — LISINOPRIL 10 MG/1
10 TABLET ORAL DAILY
Qty: 90 TABLET | Refills: 1 | Status: SHIPPED | OUTPATIENT
Start: 2025-06-17

## 2025-06-17 SDOH — SOCIAL STABILITY - SOCIAL INSECURITY: DISRUPTION OF FAMILY BY SEPARATION AND DIVORCE: Z63.5

## 2025-06-17 NOTE — ASSESSMENT & PLAN NOTE
Pt says she feels stable  Orders:    PARoxetine (Paxil) 40 mg tablet; Take 1 tablet (40 mg) by mouth once daily.

## 2025-06-17 NOTE — PROGRESS NOTES
Subjective   Patient ID: Rsoie Huynh is a 69 y.o. female who presents for Fall (Fell 3/8/2025 broke left femur, had hip replacement. Follow up from this. ).    She fell 3 months ago while walking the dog, and fractured her proximal femur badly.  She had been on prednisone daily for months, for the PMR.  She is weaning off but now her PMR pain is much worse.  For the fx she had a titanium goyo placed and replaced her hip joint.  She has done all of her rehab and feels well healed.      She needs a few refills.    Her biggest stressor is still the estrangement from her daughter.  Pt continues to try to reach out but her daughter does not respond.      Histories reviewed      Objective   /70   Pulse 52   Wt 92.1 kg (203 lb)   LMP  (LMP Unknown)   SpO2 100%   BMI 29.98 kg/m²    Physical Exam    Alert adult, NAD  Affect pleasant  Heart RRR no murmur  Lungs CTA bilat  Gait normal  Assessment & Plan  Closed fracture of left hip with routine healing, subsequent encounter  Recovery has been uncomplicated       Anxiety  Pt says she feels stable  Orders:    PARoxetine (Paxil) 40 mg tablet; Take 1 tablet (40 mg) by mouth once daily.    Family estrangement         Hypertension, essential, benign    Orders:    lisinopril 10 mg tablet; Take 1 tablet (10 mg) by mouth once daily.    Gastroesophageal reflux disease without esophagitis    Orders:    omeprazole (PriLOSEC) 20 mg DR capsule; Take 1 capsule (20 mg) by mouth once daily in the morning. Take before meals.    Mixed hyperlipidemia    Orders:    ezetimibe (Zetia) 10 mg tablet; One daily for cholesterol

## 2025-06-18 ENCOUNTER — APPOINTMENT (OUTPATIENT)
Dept: DERMATOLOGY | Facility: CLINIC | Age: 70
End: 2025-06-18
Payer: MEDICARE

## 2025-07-25 ENCOUNTER — APPOINTMENT (OUTPATIENT)
Dept: DERMATOLOGY | Facility: CLINIC | Age: 70
End: 2025-07-25
Payer: MEDICARE

## 2025-08-06 ENCOUNTER — APPOINTMENT (OUTPATIENT)
Facility: CLINIC | Age: 70
End: 2025-08-06
Payer: MEDICARE

## 2025-08-07 ENCOUNTER — APPOINTMENT (OUTPATIENT)
Dept: PRIMARY CARE | Facility: CLINIC | Age: 70
End: 2025-08-07
Payer: MEDICARE

## 2025-08-19 DIAGNOSIS — Z12.31 ENCOUNTER FOR SCREENING MAMMOGRAM FOR BREAST CANCER: ICD-10-CM

## 2025-08-20 ENCOUNTER — TELEPHONE (OUTPATIENT)
Dept: PRIMARY CARE | Facility: CLINIC | Age: 70
End: 2025-08-20
Payer: MEDICARE

## 2025-09-17 ENCOUNTER — APPOINTMENT (OUTPATIENT)
Dept: DERMATOLOGY | Facility: CLINIC | Age: 70
End: 2025-09-17
Payer: MEDICARE

## 2025-10-01 ENCOUNTER — APPOINTMENT (OUTPATIENT)
Facility: CLINIC | Age: 70
End: 2025-10-01
Payer: MEDICARE

## 2025-10-25 ENCOUNTER — APPOINTMENT (OUTPATIENT)
Dept: RADIOLOGY | Facility: CLINIC | Age: 70
End: 2025-10-25
Payer: MEDICARE

## 2025-10-27 ENCOUNTER — APPOINTMENT (OUTPATIENT)
Dept: PRIMARY CARE | Facility: CLINIC | Age: 70
End: 2025-10-27
Payer: MEDICARE